# Patient Record
Sex: MALE | Race: WHITE | NOT HISPANIC OR LATINO | Employment: FULL TIME | URBAN - METROPOLITAN AREA
[De-identification: names, ages, dates, MRNs, and addresses within clinical notes are randomized per-mention and may not be internally consistent; named-entity substitution may affect disease eponyms.]

---

## 2023-02-18 PROBLEM — M51.369 DDD (DEGENERATIVE DISC DISEASE), LUMBAR: Status: ACTIVE | Noted: 2023-02-18

## 2023-02-18 PROBLEM — M51.36 DDD (DEGENERATIVE DISC DISEASE), LUMBAR: Status: ACTIVE | Noted: 2023-02-18

## 2023-02-18 PROBLEM — R93.2 ABNORMAL ULTRASOUND OF LIVER: Status: ACTIVE | Noted: 2023-02-18

## 2023-02-18 PROBLEM — E78.1 ESSENTIAL HYPERTRIGLYCERIDEMIA: Status: ACTIVE | Noted: 2023-02-18

## 2023-02-18 PROBLEM — I10 BENIGN ESSENTIAL HYPERTENSION: Status: ACTIVE | Noted: 2023-02-18

## 2023-02-18 PROBLEM — E55.9 VITAMIN D DEFICIENCY: Status: ACTIVE | Noted: 2023-02-18

## 2023-02-18 PROBLEM — M25.512 CHRONIC LEFT SHOULDER PAIN: Status: ACTIVE | Noted: 2023-02-18

## 2023-02-18 PROBLEM — M79.672 LEFT FOOT PAIN: Status: ACTIVE | Noted: 2023-02-18

## 2023-02-18 PROBLEM — K76.0 NAFLD (NONALCOHOLIC FATTY LIVER DISEASE): Status: ACTIVE | Noted: 2023-02-18

## 2023-02-18 PROBLEM — M75.42 IMPINGEMENT SYNDROME OF LEFT SHOULDER: Status: ACTIVE | Noted: 2023-02-18

## 2023-02-18 PROBLEM — E66.812 CLASS 2 SEVERE OBESITY DUE TO EXCESS CALORIES WITH SERIOUS COMORBIDITY AND BODY MASS INDEX (BMI) OF 39.0 TO 39.9 IN ADULT: Status: ACTIVE | Noted: 2023-02-18

## 2023-02-18 PROBLEM — Z86.16 HISTORY OF COVID-19: Status: ACTIVE | Noted: 2023-02-18

## 2023-02-18 PROBLEM — G47.33 OBSTRUCTIVE SLEEP APNEA: Status: ACTIVE | Noted: 2023-02-18

## 2023-02-18 PROBLEM — R79.89 ABNORMAL LFTS: Status: ACTIVE | Noted: 2023-02-18

## 2023-02-18 PROBLEM — R82.90 ABNORMAL URINE ODOR: Status: ACTIVE | Noted: 2023-02-18

## 2023-02-18 PROBLEM — M67.819 TENDINOSIS OF ROTATOR CUFF: Status: ACTIVE | Noted: 2023-02-18

## 2023-02-18 PROBLEM — E78.5 HYPERLIPIDEMIA: Status: ACTIVE | Noted: 2023-02-18

## 2023-02-18 PROBLEM — K76.9 LIVER LESION: Status: ACTIVE | Noted: 2023-02-18

## 2023-02-18 PROBLEM — R10.11 RIGHT UPPER QUADRANT ABDOMINAL PAIN: Status: ACTIVE | Noted: 2023-02-18

## 2023-02-18 PROBLEM — R07.89 ATYPICAL CHEST PAIN: Status: ACTIVE | Noted: 2023-02-18

## 2023-02-18 PROBLEM — M54.50 LUMBAR SPINE PAIN: Status: ACTIVE | Noted: 2023-02-18

## 2023-02-18 PROBLEM — G89.29 CHRONIC LEFT SHOULDER PAIN: Status: ACTIVE | Noted: 2023-02-18

## 2023-02-18 PROBLEM — G93.0 INTRACRANIAL ARACHNOID CYST: Status: ACTIVE | Noted: 2023-02-18

## 2023-02-18 PROBLEM — L98.9 SKIN LESIONS: Status: ACTIVE | Noted: 2023-02-18

## 2023-02-18 PROBLEM — Z04.9 CONDITION NOT FOUND: Status: ACTIVE | Noted: 2023-02-18

## 2023-02-18 PROBLEM — M19.072 ARTHRITIS OF FOOT, LEFT: Status: ACTIVE | Noted: 2023-02-18

## 2023-02-18 PROBLEM — E83.10 DISORDER OF IRON METABOLISM: Status: ACTIVE | Noted: 2023-02-18

## 2023-02-18 PROBLEM — E66.01 MORBID OBESITY (MULTI): Status: ACTIVE | Noted: 2023-02-18

## 2023-02-18 PROBLEM — K64.9 HEMORRHOID: Status: ACTIVE | Noted: 2023-02-18

## 2023-02-18 PROBLEM — K62.5 RECTAL BLEEDING: Status: ACTIVE | Noted: 2023-02-18

## 2023-02-18 RX ORDER — LISINOPRIL 20 MG/1
1 TABLET ORAL DAILY
COMMUNITY
Start: 2022-05-10 | End: 2024-03-08 | Stop reason: SDUPTHER

## 2023-02-18 RX ORDER — FENOFIBRATE 160 MG/1
1 TABLET ORAL DAILY
COMMUNITY
Start: 2021-04-07 | End: 2024-03-08 | Stop reason: SDUPTHER

## 2023-02-18 RX ORDER — ACETAMINOPHEN 500 MG
1 TABLET ORAL DAILY
COMMUNITY
Start: 2021-04-07

## 2023-03-08 ENCOUNTER — APPOINTMENT (OUTPATIENT)
Dept: PRIMARY CARE | Facility: CLINIC | Age: 43
End: 2023-03-08
Payer: COMMERCIAL

## 2023-10-12 ENCOUNTER — HOSPITAL ENCOUNTER (EMERGENCY)
Facility: HOSPITAL | Age: 43
Discharge: HOME | End: 2023-10-13
Attending: EMERGENCY MEDICINE
Payer: OTHER GOVERNMENT

## 2023-10-12 VITALS
WEIGHT: 240 LBS | SYSTOLIC BLOOD PRESSURE: 161 MMHG | DIASTOLIC BLOOD PRESSURE: 94 MMHG | HEART RATE: 76 BPM | BODY MASS INDEX: 38.57 KG/M2 | HEIGHT: 66 IN | RESPIRATION RATE: 18 BRPM | TEMPERATURE: 97.9 F

## 2023-10-12 DIAGNOSIS — R10.9 FLANK PAIN: Primary | ICD-10-CM

## 2023-10-12 LAB
APPEARANCE UR: CLEAR
BILIRUB UR STRIP.AUTO-MCNC: NEGATIVE MG/DL
COLOR UR: YELLOW
GLUCOSE UR STRIP.AUTO-MCNC: NEGATIVE MG/DL
HOLD SPECIMEN: NORMAL
KETONES UR STRIP.AUTO-MCNC: NEGATIVE MG/DL
LEUKOCYTE ESTERASE UR QL STRIP.AUTO: NEGATIVE
MUCOUS THREADS #/AREA URNS AUTO: ABNORMAL /LPF
NITRITE UR QL STRIP.AUTO: NEGATIVE
PH UR STRIP.AUTO: 5 [PH]
PROT UR STRIP.AUTO-MCNC: NEGATIVE MG/DL
RBC # UR STRIP.AUTO: ABNORMAL /UL
RBC #/AREA URNS AUTO: ABNORMAL /HPF
SP GR UR STRIP.AUTO: 1.02
UROBILINOGEN UR STRIP.AUTO-MCNC: 2 MG/DL
WBC #/AREA URNS AUTO: ABNORMAL /HPF

## 2023-10-12 PROCEDURE — 81001 URINALYSIS AUTO W/SCOPE: CPT | Performed by: EMERGENCY MEDICINE

## 2023-10-12 PROCEDURE — 99284 EMERGENCY DEPT VISIT MOD MDM: CPT | Mod: 25 | Performed by: EMERGENCY MEDICINE

## 2023-10-12 RX ORDER — OMEPRAZOLE 20 MG/1
20 CAPSULE, DELAYED RELEASE ORAL
COMMUNITY
Start: 2023-03-17

## 2023-10-12 ASSESSMENT — LIFESTYLE VARIABLES
EVER FELT BAD OR GUILTY ABOUT YOUR DRINKING: NO
HAVE PEOPLE ANNOYED YOU BY CRITICIZING YOUR DRINKING: NO
EVER HAD A DRINK FIRST THING IN THE MORNING TO STEADY YOUR NERVES TO GET RID OF A HANGOVER: NO
REASON UNABLE TO ASSESS: NO
HAVE YOU EVER FELT YOU SHOULD CUT DOWN ON YOUR DRINKING: NO

## 2023-10-12 ASSESSMENT — PAIN SCALES - GENERAL: PAINLEVEL_OUTOF10: 6

## 2023-10-12 ASSESSMENT — PAIN - FUNCTIONAL ASSESSMENT: PAIN_FUNCTIONAL_ASSESSMENT: 0-10

## 2023-10-12 ASSESSMENT — COLUMBIA-SUICIDE SEVERITY RATING SCALE - C-SSRS
1. IN THE PAST MONTH, HAVE YOU WISHED YOU WERE DEAD OR WISHED YOU COULD GO TO SLEEP AND NOT WAKE UP?: NO
6. HAVE YOU EVER DONE ANYTHING, STARTED TO DO ANYTHING, OR PREPARED TO DO ANYTHING TO END YOUR LIFE?: NO
2. HAVE YOU ACTUALLY HAD ANY THOUGHTS OF KILLING YOURSELF?: NO

## 2023-10-12 ASSESSMENT — PAIN DESCRIPTION - DESCRIPTORS: DESCRIPTORS: SHARP

## 2023-10-12 ASSESSMENT — PAIN DESCRIPTION - ORIENTATION: ORIENTATION: RIGHT

## 2023-10-13 ENCOUNTER — APPOINTMENT (OUTPATIENT)
Dept: RADIOLOGY | Facility: HOSPITAL | Age: 43
End: 2023-10-13
Payer: OTHER GOVERNMENT

## 2023-10-13 LAB
ALBUMIN SERPL BCP-MCNC: 4.5 G/DL (ref 3.4–5)
ALP SERPL-CCNC: 69 U/L (ref 33–120)
ALT SERPL W P-5'-P-CCNC: 91 U/L (ref 10–52)
ANION GAP SERPL CALC-SCNC: 12 MMOL/L (ref 10–20)
AST SERPL W P-5'-P-CCNC: 65 U/L (ref 9–39)
BASOPHILS # BLD AUTO: 0.07 X10*3/UL (ref 0–0.1)
BASOPHILS NFR BLD AUTO: 1 %
BILIRUB DIRECT SERPL-MCNC: 0 MG/DL (ref 0–0.3)
BILIRUB SERPL-MCNC: 0.8 MG/DL (ref 0–1.2)
BUN SERPL-MCNC: 11 MG/DL (ref 6–23)
CALCIUM SERPL-MCNC: 9.3 MG/DL (ref 8.6–10.3)
CHLORIDE SERPL-SCNC: 101 MMOL/L (ref 98–107)
CO2 SERPL-SCNC: 25 MMOL/L (ref 21–32)
CREAT SERPL-MCNC: 0.82 MG/DL (ref 0.5–1.3)
EOSINOPHIL # BLD AUTO: 0.2 X10*3/UL (ref 0–0.7)
EOSINOPHIL NFR BLD AUTO: 2.7 %
ERYTHROCYTE [DISTWIDTH] IN BLOOD BY AUTOMATED COUNT: 12.7 % (ref 11.5–14.5)
GFR SERPL CREATININE-BSD FRML MDRD: >90 ML/MIN/1.73M*2
GLUCOSE SERPL-MCNC: 107 MG/DL (ref 74–99)
HCT VFR BLD AUTO: 44.1 % (ref 41–52)
HGB BLD-MCNC: 15.1 G/DL (ref 13.5–17.5)
HOLD SPECIMEN: NORMAL
IMM GRANULOCYTES # BLD AUTO: 0.02 X10*3/UL (ref 0–0.7)
IMM GRANULOCYTES NFR BLD AUTO: 0.3 % (ref 0–0.9)
LIPASE SERPL-CCNC: 28 U/L (ref 9–82)
LYMPHOCYTES # BLD AUTO: 2.84 X10*3/UL (ref 1.2–4.8)
LYMPHOCYTES NFR BLD AUTO: 39 %
MCH RBC QN AUTO: 29.7 PG (ref 26–34)
MCHC RBC AUTO-ENTMCNC: 34.2 G/DL (ref 32–36)
MCV RBC AUTO: 87 FL (ref 80–100)
MONOCYTES # BLD AUTO: 0.74 X10*3/UL (ref 0.1–1)
MONOCYTES NFR BLD AUTO: 10.2 %
NEUTROPHILS # BLD AUTO: 3.42 X10*3/UL (ref 1.2–7.7)
NEUTROPHILS NFR BLD AUTO: 46.8 %
NRBC BLD-RTO: 0 /100 WBCS (ref 0–0)
PLATELET # BLD AUTO: 170 X10*3/UL (ref 150–450)
PMV BLD AUTO: 11 FL (ref 7.5–11.5)
POTASSIUM SERPL-SCNC: 3.4 MMOL/L (ref 3.5–5.3)
PROT SERPL-MCNC: 7 G/DL (ref 6.4–8.2)
RBC # BLD AUTO: 5.08 X10*6/UL (ref 4.5–5.9)
SODIUM SERPL-SCNC: 135 MMOL/L (ref 136–145)
WBC # BLD AUTO: 7.3 X10*3/UL (ref 4.4–11.3)

## 2023-10-13 PROCEDURE — 85025 COMPLETE CBC W/AUTO DIFF WBC: CPT | Performed by: EMERGENCY MEDICINE

## 2023-10-13 PROCEDURE — 83690 ASSAY OF LIPASE: CPT | Performed by: EMERGENCY MEDICINE

## 2023-10-13 PROCEDURE — 36415 COLL VENOUS BLD VENIPUNCTURE: CPT | Performed by: EMERGENCY MEDICINE

## 2023-10-13 PROCEDURE — 80053 COMPREHEN METABOLIC PANEL: CPT | Performed by: EMERGENCY MEDICINE

## 2023-10-13 PROCEDURE — 82248 BILIRUBIN DIRECT: CPT | Performed by: EMERGENCY MEDICINE

## 2023-10-13 PROCEDURE — 2500000004 HC RX 250 GENERAL PHARMACY W/ HCPCS (ALT 636 FOR OP/ED): Performed by: EMERGENCY MEDICINE

## 2023-10-13 PROCEDURE — 74176 CT ABD & PELVIS W/O CONTRAST: CPT | Mod: FOREIGN READ | Performed by: RADIOLOGY

## 2023-10-13 PROCEDURE — 74176 CT ABD & PELVIS W/O CONTRAST: CPT | Mod: FR

## 2023-10-13 RX ORDER — KETOROLAC TROMETHAMINE 30 MG/ML
15 INJECTION, SOLUTION INTRAMUSCULAR; INTRAVENOUS ONCE
Status: COMPLETED | OUTPATIENT
Start: 2023-10-13 | End: 2023-10-13

## 2023-10-13 RX ORDER — ACETAMINOPHEN 325 MG/1
650 TABLET ORAL ONCE
Status: COMPLETED | OUTPATIENT
Start: 2023-10-13 | End: 2023-10-13

## 2023-10-13 RX ORDER — NAPROXEN 500 MG/1
500 TABLET ORAL
Qty: 30 TABLET | Refills: 0 | Status: SHIPPED | OUTPATIENT
Start: 2023-10-13 | End: 2023-10-28

## 2023-10-13 RX ORDER — METHOCARBAMOL 500 MG/1
500 TABLET, FILM COATED ORAL 2 TIMES DAILY
Qty: 20 TABLET | Refills: 0 | Status: SHIPPED | OUTPATIENT
Start: 2023-10-13 | End: 2023-10-23

## 2023-10-13 RX ORDER — LIDOCAINE 50 MG/G
1 PATCH TOPICAL DAILY
Qty: 10 PATCH | Refills: 0 | Status: SHIPPED | OUTPATIENT
Start: 2023-10-13 | End: 2023-10-23

## 2023-10-13 RX ORDER — ONDANSETRON HYDROCHLORIDE 2 MG/ML
4 INJECTION, SOLUTION INTRAVENOUS ONCE
Status: COMPLETED | OUTPATIENT
Start: 2023-10-13 | End: 2023-10-13

## 2023-10-13 RX ADMIN — KETOROLAC TROMETHAMINE 15 MG: 30 INJECTION, SOLUTION INTRAMUSCULAR at 00:44

## 2023-10-13 RX ADMIN — ACETAMINOPHEN 650 MG: 325 TABLET ORAL at 00:50

## 2023-10-13 RX ADMIN — ONDANSETRON 4 MG: 2 INJECTION INTRAMUSCULAR; INTRAVENOUS at 00:44

## 2023-10-13 RX ADMIN — SODIUM CHLORIDE 1000 ML: 9 INJECTION, SOLUTION INTRAVENOUS at 00:51

## 2023-10-13 ASSESSMENT — PAIN SCALES - GENERAL: PAINLEVEL_OUTOF10: 7

## 2023-10-13 NOTE — ED TRIAGE NOTES
Pt to ED with c/o R flank pain since yesterday. Denies urinary symptoms or any injuries. No meds pta for pain.

## 2023-10-13 NOTE — ED PROVIDER NOTES
HPI   Chief Complaint   Patient presents with   • Flank Pain     R       HPI                    No data recorded                Patient History   Past Medical History:   Diagnosis Date   • GERD (gastroesophageal reflux disease)    • High triglycerides    • Hypertension    • Personal history of COVID-19     History of COVID-19     Past Surgical History:   Procedure Laterality Date   • OTHER SURGICAL HISTORY  05/19/2022    [place] tooth extraction   • OTHER SURGICAL HISTORY  05/19/2022    Tongue surgery   • OTHER SURGICAL HISTORY  05/19/2022    Vasectomy   • OTHER SURGICAL HISTORY  03/23/2021    Nasal septoplasty     Family History   Problem Relation Name Age of Onset   • Other (CARDIAC DISORDER) Mother     • Diabetes Mother     • Kidney disease Mother     • Liver disease Mother     • Other (CVA) Brother     • Deep vein thrombosis Brother     • Lung cancer Brother     • Brain cancer Brother       Social History     Tobacco Use   • Smoking status: Some Days     Types: Cigarettes   • Smokeless tobacco: Never   Vaping Use   • Vaping Use: Never used   Substance Use Topics   • Alcohol use: Not Currently   • Drug use: Never       Physical Exam   ED Triage Vitals [10/12/23 2051]   Temp Heart Rate Resp BP   36.6 °C (97.9 °F) 76 18 (!) 161/94      SpO2 Temp Source Heart Rate Source Patient Position   -- Temporal -- --      BP Location FiO2 (%)     -- --       Physical Exam    ED Course & MDM   ED Course as of 10/17/23 1551   Fri Oct 13, 2023   0403 Patient's labs with small amount of RBCs in the urine, no evidence of infection.  Patient's BMP largely reassuring, cell count unremarkable, lipase unremarkable, CT scan showing no acute intra-abdominal pathology.  Patient's pain improved following medication administration.  Given location of pain and negative imaging/unremarkable labs, feel this is most likely musculoskeletal in nature, will treat with lidocaine patches, naproxen.  Patient's LFTs are minimally elevated but this  appears nearly baseline for the patient and patient does have steatohepatitis on CT scan.  Discussed return precautions, medication use and need for close follow-up with the patient, he conveys understanding to these instructions.  Patient discharged in good condition. [BR]      ED Course User Index  [BR] Tej Mojica MD         Diagnoses as of 10/17/23 1551   Flank pain       Medical Decision Making      Procedure  Procedures     Tej Mojica MD  10/17/23 1551

## 2024-03-08 ENCOUNTER — TELEPHONE (OUTPATIENT)
Dept: PRIMARY CARE | Facility: CLINIC | Age: 44
End: 2024-03-08
Payer: OTHER GOVERNMENT

## 2024-03-08 DIAGNOSIS — E78.1 ESSENTIAL HYPERTRIGLYCERIDEMIA: ICD-10-CM

## 2024-03-08 DIAGNOSIS — I10 BENIGN ESSENTIAL HYPERTENSION: ICD-10-CM

## 2024-03-08 NOTE — TELEPHONE ENCOUNTER
Rx Refill Request Telephone Encounter    Name:  Ralph Shelton  :  [id number]  Medication Name:      lisinopril 20 mg tablet   Route: Take 1 tablet (20 mg) by mouth once daily.    fenofibrate (Triglide) 160 mg tablet   Route: Take 1 tablet (160 mg) by mouth once daily.    Specific Pharmacy location:  [address]   Date of last appointment:  22  Date of next appointment:  4/3/24  Best number to reach patient: [phone]

## 2024-03-10 DIAGNOSIS — I10 BENIGN ESSENTIAL HYPERTENSION: ICD-10-CM

## 2024-03-10 DIAGNOSIS — E78.2 MIXED HYPERLIPIDEMIA: ICD-10-CM

## 2024-03-10 DIAGNOSIS — E55.9 VITAMIN D DEFICIENCY: Primary | ICD-10-CM

## 2024-03-10 DIAGNOSIS — K76.0 NAFLD (NONALCOHOLIC FATTY LIVER DISEASE): ICD-10-CM

## 2024-03-10 DIAGNOSIS — Z13.29 THYROID DISORDER SCREEN: ICD-10-CM

## 2024-03-10 PROBLEM — R07.89 ATYPICAL CHEST PAIN: Status: RESOLVED | Noted: 2023-02-18 | Resolved: 2024-03-10

## 2024-03-10 RX ORDER — FENOFIBRATE 160 MG/1
160 TABLET ORAL DAILY
Qty: 90 TABLET | Refills: 3 | Status: SHIPPED | OUTPATIENT
Start: 2024-03-10

## 2024-03-10 RX ORDER — LISINOPRIL 20 MG/1
20 TABLET ORAL DAILY
Qty: 90 TABLET | Refills: 3 | Status: SHIPPED | OUTPATIENT
Start: 2024-03-10

## 2024-03-11 ENCOUNTER — APPOINTMENT (OUTPATIENT)
Dept: CARDIOLOGY | Facility: HOSPITAL | Age: 44
End: 2024-03-11
Payer: OTHER GOVERNMENT

## 2024-03-11 ENCOUNTER — HOSPITAL ENCOUNTER (EMERGENCY)
Facility: HOSPITAL | Age: 44
Discharge: HOME | End: 2024-03-11
Attending: EMERGENCY MEDICINE
Payer: OTHER GOVERNMENT

## 2024-03-11 ENCOUNTER — APPOINTMENT (OUTPATIENT)
Dept: RADIOLOGY | Facility: HOSPITAL | Age: 44
End: 2024-03-11
Payer: OTHER GOVERNMENT

## 2024-03-11 VITALS
TEMPERATURE: 98.5 F | HEART RATE: 69 BPM | SYSTOLIC BLOOD PRESSURE: 141 MMHG | HEIGHT: 66 IN | BODY MASS INDEX: 39.37 KG/M2 | DIASTOLIC BLOOD PRESSURE: 80 MMHG | OXYGEN SATURATION: 97 % | RESPIRATION RATE: 18 BRPM | WEIGHT: 245 LBS

## 2024-03-11 DIAGNOSIS — J06.9 UPPER RESPIRATORY TRACT INFECTION, UNSPECIFIED TYPE: Primary | ICD-10-CM

## 2024-03-11 LAB
ALBUMIN SERPL BCP-MCNC: 4.4 G/DL (ref 3.4–5)
ALP SERPL-CCNC: 83 U/L (ref 33–120)
ALT SERPL W P-5'-P-CCNC: 90 U/L (ref 10–52)
ANION GAP SERPL CALC-SCNC: 11 MMOL/L (ref 10–20)
AST SERPL W P-5'-P-CCNC: 55 U/L (ref 9–39)
ATRIAL RATE: 73 BPM
BASOPHILS # BLD AUTO: 0.06 X10*3/UL (ref 0–0.1)
BASOPHILS NFR BLD AUTO: 0.9 %
BILIRUB DIRECT SERPL-MCNC: 0.1 MG/DL (ref 0–0.3)
BILIRUB SERPL-MCNC: 0.4 MG/DL (ref 0–1.2)
BUN SERPL-MCNC: 8 MG/DL (ref 6–23)
CALCIUM SERPL-MCNC: 9.2 MG/DL (ref 8.6–10.3)
CARDIAC TROPONIN I PNL SERPL HS: 11 NG/L (ref 0–20)
CHLORIDE SERPL-SCNC: 103 MMOL/L (ref 98–107)
CO2 SERPL-SCNC: 24 MMOL/L (ref 21–32)
CREAT SERPL-MCNC: 0.76 MG/DL (ref 0.5–1.3)
EGFRCR SERPLBLD CKD-EPI 2021: >90 ML/MIN/1.73M*2
EOSINOPHIL # BLD AUTO: 0.11 X10*3/UL (ref 0–0.7)
EOSINOPHIL NFR BLD AUTO: 1.7 %
ERYTHROCYTE [DISTWIDTH] IN BLOOD BY AUTOMATED COUNT: 12.6 % (ref 11.5–14.5)
GLUCOSE SERPL-MCNC: 129 MG/DL (ref 74–99)
HCT VFR BLD AUTO: 42.2 % (ref 41–52)
HGB BLD-MCNC: 14.3 G/DL (ref 13.5–17.5)
IMM GRANULOCYTES # BLD AUTO: 0.04 X10*3/UL (ref 0–0.7)
IMM GRANULOCYTES NFR BLD AUTO: 0.6 % (ref 0–0.9)
LYMPHOCYTES # BLD AUTO: 2.09 X10*3/UL (ref 1.2–4.8)
LYMPHOCYTES NFR BLD AUTO: 31.4 %
MCH RBC QN AUTO: 29 PG (ref 26–34)
MCHC RBC AUTO-ENTMCNC: 33.9 G/DL (ref 32–36)
MCV RBC AUTO: 86 FL (ref 80–100)
MONOCYTES # BLD AUTO: 0.56 X10*3/UL (ref 0.1–1)
MONOCYTES NFR BLD AUTO: 8.4 %
NEUTROPHILS # BLD AUTO: 3.79 X10*3/UL (ref 1.2–7.7)
NEUTROPHILS NFR BLD AUTO: 57 %
NRBC BLD-RTO: 0 /100 WBCS (ref 0–0)
P AXIS: 8 DEGREES
P OFFSET: 197 MS
P ONSET: 158 MS
PLATELET # BLD AUTO: 195 X10*3/UL (ref 150–450)
POTASSIUM SERPL-SCNC: 3.9 MMOL/L (ref 3.5–5.3)
PR INTERVAL: 118 MS
PROT SERPL-MCNC: 6.9 G/DL (ref 6.4–8.2)
Q ONSET: 217 MS
QRS COUNT: 12 BEATS
QRS DURATION: 84 MS
QT INTERVAL: 342 MS
QTC CALCULATION(BAZETT): 376 MS
QTC FREDERICIA: 365 MS
R AXIS: 49 DEGREES
RBC # BLD AUTO: 4.93 X10*6/UL (ref 4.5–5.9)
SODIUM SERPL-SCNC: 134 MMOL/L (ref 136–145)
T AXIS: 94 DEGREES
T OFFSET: 388 MS
VENTRICULAR RATE: 73 BPM
WBC # BLD AUTO: 6.7 X10*3/UL (ref 4.4–11.3)

## 2024-03-11 PROCEDURE — 36415 COLL VENOUS BLD VENIPUNCTURE: CPT | Performed by: EMERGENCY MEDICINE

## 2024-03-11 PROCEDURE — 99284 EMERGENCY DEPT VISIT MOD MDM: CPT | Mod: 25

## 2024-03-11 PROCEDURE — 85025 COMPLETE CBC W/AUTO DIFF WBC: CPT | Performed by: EMERGENCY MEDICINE

## 2024-03-11 PROCEDURE — 84484 ASSAY OF TROPONIN QUANT: CPT | Performed by: EMERGENCY MEDICINE

## 2024-03-11 PROCEDURE — 71046 X-RAY EXAM CHEST 2 VIEWS: CPT

## 2024-03-11 PROCEDURE — 82248 BILIRUBIN DIRECT: CPT | Performed by: EMERGENCY MEDICINE

## 2024-03-11 PROCEDURE — 93005 ELECTROCARDIOGRAM TRACING: CPT

## 2024-03-11 PROCEDURE — 99283 EMERGENCY DEPT VISIT LOW MDM: CPT | Mod: 25

## 2024-03-11 PROCEDURE — 71046 X-RAY EXAM CHEST 2 VIEWS: CPT | Performed by: RADIOLOGY

## 2024-03-11 ASSESSMENT — COLUMBIA-SUICIDE SEVERITY RATING SCALE - C-SSRS
6. HAVE YOU EVER DONE ANYTHING, STARTED TO DO ANYTHING, OR PREPARED TO DO ANYTHING TO END YOUR LIFE?: NO
1. IN THE PAST MONTH, HAVE YOU WISHED YOU WERE DEAD OR WISHED YOU COULD GO TO SLEEP AND NOT WAKE UP?: NO
2. HAVE YOU ACTUALLY HAD ANY THOUGHTS OF KILLING YOURSELF?: NO

## 2024-03-11 ASSESSMENT — HEART SCORE
RISK FACTORS: 1-2 RISK FACTORS
AGE: <45
TROPONIN: LESS THAN OR EQUAL TO NORMAL LIMIT
HISTORY: SLIGHTLY SUSPICIOUS
ECG: NON-SPECIFIC REPOLARIZATION DISTURBANCE
HEART SCORE: 2

## 2024-03-11 NOTE — ED PROVIDER NOTES
HPI   No chief complaint on file.      HPI: []  43-year-old white male with history of hypertension, dyslipidemia, GERD today comes in with abnormal EKG.  Patient states over the last couple days he had URI symptoms cough and congestion.  Over the weekend he had annual physical examination [facility] [employer].  EKG was done which was abnormal and he was told to follow-up.  He went to urgent care this morning.  EKG was done which was abnormal and he was sent to the ED for evaluation.  He was prescribed antibiotics in the urgent care.  He has no complaints today.  He has no chest pain or shortness breath.  No dizziness no syncope or near syncope.  History of CAD in the past.  The only complaint he has a cough nonproductive.  No PND no orthopnea no syncope or near syncope.  No recent travel hospitalization or antibiotic use.    Past history: Hypertension, dyslipidemia, fatty liver, tobacco use quit about a month ago  Social: Patient is a smoker quit about a month ago denies a current tobacco alcohol drug abuse.    Past history: Hypertension  REVIEW OF SYSTEMS:    GENERAL.: No weight loss, fatigue, anorexia, insomnia, fever.    EYES: No vision loss, double vision, drainage, eye pain.    ENT: No pharyngitis, dry mouth.    CARDIOPULMONARY: No chest pain, palpitations, syncope, near syncope. No shortness of breath, positive for cough, hemoptysis.    GI: No abdominal pain, change in bowel habits, melena, hematemesis, hematochezia, nausea, vomiting, diarrhea.    : No discharge, dysuria, frequency, urgency, hematuria.    MS: No limb pain, joint pain, joint swelling.    SKIN: No rashes.    PSYCH: No depression, anxiety, suicidality, homicidality.    Review of systems is otherwise negative unless stated above or in history of present illness.  Social history, family history, allergies reviewed.  PHYSICAL EXAM:    GENERAL: Vitals noted, no distress. Alert and oriented  x 3. Non-toxic.  High BMI    EENT: TMs clear. Posterior  oropharynx unremarkable. No meningismus. No LAD.     NECK: Supple. Nontender. No midline tenderness.     CARDIAC: Regular, rate, rhythm. No murmurs rubs or gallops. No JVD    PULMONARY: Lungs clear bilaterally with good aeration. No wheezes rales or rhonchi. No respiratory distress.     ABDOMEN: Soft, nonsurgical. Nontender. No peritoneal signs. Normoactive bowel sounds. No pulsatile masses.  Negative CVA tenderness    EXTREMITIES: No peripheral edema. Negative Homans bilaterally, no cords.  2+ bounding pulses well-perfused    SKIN: No rash. Intact.     NEURO: No focal neurologic deficits, NIH score of 0. Cranial nerves normal as tested from II through XII.     MEDICAL DECISION MAKING:  EKG on my interpretation shows normal sinus rhythm normal axis rate in the mid 60s with no acute ischemic changes.  Had some nonspecific T wave inversions no ST elevations    CBC shows a leukocytosis stable hemoglobin chemistries unremarkable LFTs show transaminitis troponin negative chest x-ray negative.    Treatment in ED: IV established, placed on a cardiac monitor.    ED course: Patient remains asymptomatic no chest pain shortness of breath blood pressure upon arrival was high repeat blood pressure 140/80 he remains asymptomatic.    Impression: #1 abnormal EKG, #2 transaminitis, #3 a viral URI    Plan set MDM: 43-year-old white male history of hypertension dyslipidemia sent to the ER because of abnormal EKG EKG is not impressive from ischemic standpoint currently patient is symptom-free she has no symptoms or signs of acute coronary syndrome dissection or pulm embolism.  His troponin is negative which is reassuring.  Patient treated as a viral URI patient  Made aware of the abnormal lab findings including transaminitis, advised and outpatient follow-up with primary doctor with strict return precaution.  Low concern for ACS STEMI NSTEMI dissection or pulm embolism.                          No data recorded                    Patient History   Past Medical History:   Diagnosis Date    GERD (gastroesophageal reflux disease)     High triglycerides     Hypertension     Personal history of COVID-19     History of COVID-19     Past Surgical History:   Procedure Laterality Date    OTHER SURGICAL HISTORY  05/19/2022    [place] tooth extraction    OTHER SURGICAL HISTORY  05/19/2022    Tongue surgery    OTHER SURGICAL HISTORY  05/19/2022    Vasectomy    OTHER SURGICAL HISTORY  03/23/2021    Nasal septoplasty     Family History   Problem Relation Name Age of Onset    Other (CARDIAC DISORDER) Mother      Diabetes Mother      Kidney disease Mother      Liver disease Mother      Other (CVA) Brother      Deep vein thrombosis Brother      Lung cancer Brother      Brain cancer Brother       Social History     Tobacco Use    Smoking status: Some Days     Types: Cigarettes    Smokeless tobacco: Never   Vaping Use    Vaping Use: Never used   Substance Use Topics    Alcohol use: Not Currently    Drug use: Never       Physical Exam   ED Triage Vitals   Temperature Heart Rate Respirations BP   03/11/24 1319 03/11/24 1319 03/11/24 1319 03/11/24 1319   36.9 °C (98.5 °F) 84 15 (!) 210/99      Pulse Ox Temp src Heart Rate Source Patient Position   03/11/24 1319 -- 03/11/24 1503 03/11/24 1503   98 %  Monitor Sitting      BP Location FiO2 (%)     03/11/24 1503 --     Right arm        Physical Exam    ED Course & MDM   ED Course as of 03/11/24 1609   Mon Mar 11, 2024   1510 Patient EKG on my interpretation shows normal sinus rhythm normal axis rate in the mid 70s with nonspecific ST-T wave change.  CBC shows no leukocytosis chemistries unremarkable LFTs show transaminitis troponin negative.  Chest x-ray negative.  Patient blood pressure came down to 140/80 remains asymptomatic has a viral URI low concern for STEMI NSTEMI ACS dissection or pulm embolism.  Patient advised outpatient cardiac stress test with strict return precaution. [MT]      ED Course User  Index  [MT] Juan Pablo Vega MD         Diagnoses as of 03/11/24 1609   Upper respiratory tract infection, unspecified type       Medical Decision Making      Procedure  Procedures     Juan Pablo Vega MD  03/11/24 9017

## 2024-03-11 NOTE — ED TRIAGE NOTES
PT TO ED FROM HOME WITH C/O EKG CHANGES NOTED OVER THE WEEKEND AT A  PHYSICAL. PT RECENTLY RECOVERED FROM [facility]. PT HAS HTN HX AND STATES COMPLIANCY WITH PRESCRIBED MEDS.

## 2024-05-21 ENCOUNTER — HOSPITAL ENCOUNTER (OUTPATIENT)
Dept: RADIOLOGY | Facility: EXTERNAL LOCATION | Age: 44
Discharge: HOME | End: 2024-05-21
Payer: OTHER GOVERNMENT

## 2024-05-21 DIAGNOSIS — M79.671 RIGHT FOOT PAIN: ICD-10-CM

## 2024-05-30 ENCOUNTER — OFFICE VISIT (OUTPATIENT)
Dept: ORTHOPEDIC SURGERY | Facility: CLINIC | Age: 44
End: 2024-05-30
Payer: OTHER GOVERNMENT

## 2024-05-30 DIAGNOSIS — M76.821 POSTERIOR TIBIAL TENDINITIS OF RIGHT LOWER EXTREMITY: ICD-10-CM

## 2024-05-30 DIAGNOSIS — M65.9 SYNOVITIS OF LEFT FOOT: Primary | ICD-10-CM

## 2024-05-30 DIAGNOSIS — M65.9 SYNOVITIS OF RIGHT ANKLE: ICD-10-CM

## 2024-05-30 DIAGNOSIS — M79.671 RIGHT FOOT PAIN: ICD-10-CM

## 2024-05-30 PROCEDURE — 3008F BODY MASS INDEX DOCD: CPT | Performed by: ORTHOPAEDIC SURGERY

## 2024-05-30 PROCEDURE — 99214 OFFICE O/P EST MOD 30 MIN: CPT | Performed by: ORTHOPAEDIC SURGERY

## 2024-05-30 RX ORDER — METHYLPREDNISOLONE 4 MG/1
TABLET ORAL
Qty: 21 TABLET | Refills: 0 | Status: SHIPPED | OUTPATIENT
Start: 2024-05-30 | End: 2024-06-06

## 2024-05-30 NOTE — PROGRESS NOTES
Subjective    Patient ID: Ralph Shelton is a 44 y.o. male.    Chief Complaint: OTHER (RT FOOT PAIN FOR 4 WEEKS./NKI)       44-year-old male who presents to this office for the first time for evaluation of right foot and ankle pain.  He describes a majority the pain along the medial aspect of the foot just behind the medial malleolus.  He occasionally has sharp stabbing pain.  Pain is made worse with going up and down steps.  Also notes pain with walking.  Remains ambulatory without using assistive device.  Has been using various anti-inflammatories over-the-counter including ibuprofen and naproxen without improvement.  Patient's job in the  requires him to be on his feet for prolonged periods of time.  He has had previous issues with regard to the left foot in 2022 when he saw a foot and ankle specialist Dr. Khan and showed improvement with conservative treatment.  He never had the issue though with the right foot and ankle in the past.    This patient's past medical, social, and family history were reviewed as well as a review of systems including updates on the patient's information encounter sheet    Physical Examination  Constitutional: Patient's height and weight reviewed, appears well kempt  Psychiatric: Alert and oriented ×3, appropriate mood and behavior  Pulmonary: Breathing appears nonlabored, no apparent distress  Lymphatic: No appreciable lymphadenopathy to both the upper and lower extremities  Skin: No open lesions, rashes, ulcerations  Neurologic: Gross motor and sensory exam appear intact (except for abnormalities noted in the below muscle skeletal exam)    Musculoskeletal: The right ankle mortise is well reduced and stable to ligamentous examination.  There is no evidence of an ankle effusion.  There is a prominence of fluid collection identified posterior to the medial malleolus in the region of the posterior tibialis tendon that could be consistent with synovitis versus a ganglion  cyst.  He has marked tenderness in this area without erythema or warmth.  He can ambulate weightbearing as tolerated in the office today without a limp.  He can toe raise on the right side with some discomfort    X-rays performed of the right foot reveal joint spaces are reasonably well-maintained.  No evidence of a fracture    Assessment: Synovitis posterior tibialis tendon right ankle/foot    Plan: I suggested the patient be placed in a rocker-bottom fracture boot for the next 2 to 3 weeks and limit his activities.  Topically he can use Voltaren gel.  Because of the significant inflammation we have suggested trialing a Medrol Dosepak.  He has been referred back to Dr. Khan the foot and ankle specialist for evaluation.          Current Outpatient Medications:     fenofibrate (Triglide) 160 mg tablet, Take 1 tablet (160 mg) by mouth once daily., Disp: 90 tablet, Rfl: 3    lisinopril 20 mg tablet, Take 1 tablet (20 mg) by mouth once daily., Disp: 90 tablet, Rfl: 3    omeprazole (PriLOSEC) 20 mg DR capsule, Take 1 capsule (20 mg) by mouth once daily in the morning. Take before meals., Disp: , Rfl:     cholecalciferol (Vitamin D-3) 5,000 Units tablet, Take 1 tablet (5,000 Units) by mouth once daily., Disp: , Rfl:     methocarbamol (Robaxin) 500 mg tablet, Take 1 tablet (500 mg) by mouth 2 times a day for 10 days., Disp: 20 tablet, Rfl: 0

## 2024-05-31 DIAGNOSIS — M76.821 POSTERIOR TIBIAL TENDINITIS OF RIGHT LOWER EXTREMITY: ICD-10-CM

## 2024-05-31 PROCEDURE — L4361 PNEUMA/VAC WALK BOOT PRE OTS: HCPCS | Performed by: ORTHOPAEDIC SURGERY

## 2024-06-17 ENCOUNTER — APPOINTMENT (OUTPATIENT)
Dept: PRIMARY CARE | Facility: CLINIC | Age: 44
End: 2024-06-17
Payer: OTHER GOVERNMENT

## 2024-06-17 ENCOUNTER — TELEPHONE (OUTPATIENT)
Dept: PRIMARY CARE | Facility: CLINIC | Age: 44
End: 2024-06-17

## 2024-06-17 VITALS
DIASTOLIC BLOOD PRESSURE: 92 MMHG | SYSTOLIC BLOOD PRESSURE: 164 MMHG | OXYGEN SATURATION: 95 % | BODY MASS INDEX: 39.54 KG/M2 | TEMPERATURE: 97.7 F | HEIGHT: 66 IN | RESPIRATION RATE: 16 BRPM | HEART RATE: 71 BPM

## 2024-06-17 DIAGNOSIS — M25.562 CHRONIC PAIN OF BOTH KNEES: ICD-10-CM

## 2024-06-17 DIAGNOSIS — N45.1 EPIDIDYMITIS: ICD-10-CM

## 2024-06-17 DIAGNOSIS — I10 BENIGN ESSENTIAL HYPERTENSION: Primary | ICD-10-CM

## 2024-06-17 DIAGNOSIS — R21 RASH AND NONSPECIFIC SKIN ERUPTION: ICD-10-CM

## 2024-06-17 DIAGNOSIS — R19.8 CHANGE IN BOWEL MOVEMENT: ICD-10-CM

## 2024-06-17 DIAGNOSIS — G93.0 INTRACRANIAL ARACHNOID CYST: ICD-10-CM

## 2024-06-17 DIAGNOSIS — G56.90 UPPER EXTREMITY NEUROPATHY, UNSPECIFIED LATERALITY: ICD-10-CM

## 2024-06-17 DIAGNOSIS — E78.2 MIXED HYPERLIPIDEMIA: ICD-10-CM

## 2024-06-17 DIAGNOSIS — M25.571 CHRONIC PAIN OF RIGHT ANKLE: ICD-10-CM

## 2024-06-17 DIAGNOSIS — R94.31 ABNORMAL EKG: ICD-10-CM

## 2024-06-17 DIAGNOSIS — M25.561 CHRONIC PAIN OF BOTH KNEES: ICD-10-CM

## 2024-06-17 DIAGNOSIS — Z00.00 ANNUAL PHYSICAL EXAM: ICD-10-CM

## 2024-06-17 DIAGNOSIS — L84 FOOT CALLUS: ICD-10-CM

## 2024-06-17 DIAGNOSIS — K22.70 BARRETT'S ESOPHAGUS WITH ESOPHAGITIS: ICD-10-CM

## 2024-06-17 DIAGNOSIS — K20.90 BARRETT'S ESOPHAGUS WITH ESOPHAGITIS: ICD-10-CM

## 2024-06-17 DIAGNOSIS — G89.29 CHRONIC PAIN OF BOTH KNEES: ICD-10-CM

## 2024-06-17 DIAGNOSIS — G89.29 CHRONIC PAIN OF RIGHT ANKLE: ICD-10-CM

## 2024-06-17 DIAGNOSIS — E66.01 CLASS 2 SEVERE OBESITY DUE TO EXCESS CALORIES WITH SERIOUS COMORBIDITY AND BODY MASS INDEX (BMI) OF 39.0 TO 39.9 IN ADULT (MULTI): ICD-10-CM

## 2024-06-17 PROBLEM — R82.90 ABNORMAL URINE ODOR: Status: RESOLVED | Noted: 2023-02-18 | Resolved: 2024-06-17

## 2024-06-17 PROBLEM — R93.2 ABNORMAL ULTRASOUND OF LIVER: Status: RESOLVED | Noted: 2023-02-18 | Resolved: 2024-06-17

## 2024-06-17 PROBLEM — R10.11 RIGHT UPPER QUADRANT ABDOMINAL PAIN: Status: RESOLVED | Noted: 2023-02-18 | Resolved: 2024-06-17

## 2024-06-17 PROBLEM — L98.9 SKIN LESIONS: Status: RESOLVED | Noted: 2023-02-18 | Resolved: 2024-06-17

## 2024-06-17 PROCEDURE — 99396 PREV VISIT EST AGE 40-64: CPT | Performed by: FAMILY MEDICINE

## 2024-06-17 PROCEDURE — 99214 OFFICE O/P EST MOD 30 MIN: CPT | Performed by: FAMILY MEDICINE

## 2024-06-17 PROCEDURE — 3080F DIAST BP >= 90 MM HG: CPT | Performed by: FAMILY MEDICINE

## 2024-06-17 PROCEDURE — 3077F SYST BP >= 140 MM HG: CPT | Performed by: FAMILY MEDICINE

## 2024-06-17 PROCEDURE — 3008F BODY MASS INDEX DOCD: CPT | Performed by: FAMILY MEDICINE

## 2024-06-17 RX ORDER — METOPROLOL SUCCINATE 25 MG/1
25 TABLET, EXTENDED RELEASE ORAL DAILY
Qty: 30 TABLET | Refills: 5 | Status: SHIPPED | OUTPATIENT
Start: 2024-06-17 | End: 2024-12-14

## 2024-06-17 ASSESSMENT — ENCOUNTER SYMPTOMS
CHEST TIGHTNESS: 0
ABDOMINAL PAIN: 0
CONFUSION: 0
OCCASIONAL FEELINGS OF UNSTEADINESS: 0
NAUSEA: 0
BLOOD IN STOOL: 0
ROS SKIN COMMENTS: FOOT CALLUS
CHILLS: 0
PALPITATIONS: 0
FEVER: 0
SHORTNESS OF BREATH: 0
ARTHRALGIAS: 0
RECTAL PAIN: 0
NUMBNESS: 1
DEPRESSION: 0
LOSS OF SENSATION IN FEET: 0

## 2024-06-17 ASSESSMENT — COLUMBIA-SUICIDE SEVERITY RATING SCALE - C-SSRS
2. HAVE YOU ACTUALLY HAD ANY THOUGHTS OF KILLING YOURSELF?: NO
1. IN THE PAST MONTH, HAVE YOU WISHED YOU WERE DEAD OR WISHED YOU COULD GO TO SLEEP AND NOT WAKE UP?: NO
6. HAVE YOU EVER DONE ANYTHING, STARTED TO DO ANYTHING, OR PREPARED TO DO ANYTHING TO END YOUR LIFE?: NO

## 2024-06-17 ASSESSMENT — PATIENT HEALTH QUESTIONNAIRE - PHQ9
1. LITTLE INTEREST OR PLEASURE IN DOING THINGS: NOT AT ALL
2. FEELING DOWN, DEPRESSED OR HOPELESS: NOT AT ALL
SUM OF ALL RESPONSES TO PHQ9 QUESTIONS 1 AND 2: 0

## 2024-06-17 NOTE — ASSESSMENT & PLAN NOTE
Patient today for annual checkup states he plans on getting blood work completed in the next couple of weeks.  And has a list of issues he would like to address.

## 2024-06-17 NOTE — ASSESSMENT & PLAN NOTE
Previous CT scans and imaging studies of the head reviewed with patient.  Based on previous study this appears stable.  Offered referral to neurosurgery for second opinion he declined he states that he will get it addressed to the  as he transitions to group home and has exit physicals.

## 2024-06-17 NOTE — ASSESSMENT & PLAN NOTE
Cardiology referral given abnormalities noted on recent EKG along with other risk factors such as hypertension and hyperlipidemia and obesity

## 2024-06-17 NOTE — PROGRESS NOTES
Subjective   Patient ID: Ralph Shelton is a 44 y.o. male who presents for Annual Exam (Physical exam ).    HPI   Patient today for annual checkup and has a list of issues he would like to address.  Also plans on getting his blood work done within the next couple weeks.  He states earlier this year he was seen in urgent care had an EKG and there is noted some abnormalities of possible ischemia he subsequent was sent to the ER for evaluation workup in the ER did not reveal any acute issues and he was released.  Denies chest pain denies shortness of breath.  He was seen at urgent care for right ankle pain which is persisting and he would like referral to Dr. Khan for further evaluation.  Next he has been experiencing numbness and tingling in his hands and fingers varies throughout the day.  Also request podiatry referral for callus on his right foot.  Next complains of pain in both his kneecaps when exercising cannot really pinpoint of a definitive trigger.  He is also been noticing inconsistency with his stool patterns with stool consistency and frequency varying.  He denies abdominal pain.  Also since he has been in last he was evaluated by GI had an EGD performed was diagnosed with Rollins's esophagus.  Is on omeprazole.  He states that few years ago he had epididymitis ever since then he seems to get recurrent cases of epididymitis once or twice a year he would like to have urology evaluation.  Finally he states he continues with CPAP and see sleep medicine specialist although he says his current machine needs repairs he is going to reach out to his sleep medicine doctor to help facilitate.  Finally states he would like to see dermatology regarding rash she gets periodically in his thighs.  Review of Systems   Constitutional:  Negative for chills and fever.   HENT:  Positive for tinnitus. Negative for congestion and ear pain.    Eyes:  Negative for visual disturbance.   Respiratory:  Negative for chest  "tightness and shortness of breath.    Cardiovascular:  Negative for chest pain and palpitations.   Gastrointestinal:  Negative for abdominal pain, blood in stool, nausea and rectal pain.        Varying stool consistency and frequency   Musculoskeletal:  Negative for arthralgias.        Bilateral knee pain right ankle pain   Skin:  Positive for rash. Negative for pallor.        Foot callus   Neurological:  Positive for numbness.        Numbness tingling in hands and fingers   Psychiatric/Behavioral:  Negative for confusion.        Objective   BP (!) 164/92 (BP Location: Left arm, Patient Position: Sitting, BP Cuff Size: Large adult)   Pulse 71   Temp 36.5 °C (97.7 °F) (Temporal)   Resp 16   Ht 1.676 m (5' 6\")   SpO2 95%   BMI 39.54 kg/m²     Physical Exam  Vitals and nursing note reviewed.   Constitutional:       General: He is not in acute distress.     Appearance: Normal appearance. He is not ill-appearing.   HENT:      Head: Normocephalic and atraumatic.      Right Ear: Tympanic membrane, ear canal and external ear normal.      Left Ear: Tympanic membrane, ear canal and external ear normal.      Mouth/Throat:      Pharynx: Oropharynx is clear.   Eyes:      Extraocular Movements: Extraocular movements intact.   Cardiovascular:      Rate and Rhythm: Normal rate and regular rhythm.      Pulses: Normal pulses.      Heart sounds: Normal heart sounds.   Pulmonary:      Effort: Pulmonary effort is normal.      Breath sounds: Normal breath sounds.   Abdominal:      General: Abdomen is flat. Bowel sounds are normal. There is no distension.      Palpations: Abdomen is soft.      Tenderness: There is no abdominal tenderness. There is no guarding or rebound.   Musculoskeletal:         General: No swelling or tenderness. Normal range of motion.      Cervical back: Neck supple.      Comments: Handgrips equal bilaterally negative Tinel's sign upper extremity strength equal bilaterally   Skin:     General: Skin is warm.     "  Findings: No rash.   Neurological:      Mental Status: He is alert and oriented to person, place, and time. Mental status is at baseline.   Psychiatric:         Mood and Affect: Mood normal.     Patient to get fasting lab work completed    Referrals placed for cardiology, orthopedics, podiatry, gastroenterology, urology, and dermatology.    BP not to goal add metoprolol succinate XL 25 mg nightly.  Continue lisinopril.    X-ray of cervical spine and bilateral upper extremity EMG    Return to office in 8 weeks to recheck blood pressure review lab work and specialty input and reassess his case.    Assessment/Plan

## 2024-06-17 NOTE — TELEPHONE ENCOUNTER
Patient's wife called in and stated that she is very concerned about her 's diet. The patient i scheduled for his annual visit today . Mrs. Shelton is concerned because her  consumes a lot of sugar and sugary beverages. Mrs. Shelton would like for you to speak with him about his diet. Mrs. Shelton also wants you to keep it discreet that she reached out about this

## 2024-06-17 NOTE — ASSESSMENT & PLAN NOTE
Symptomatically stable on omeprazole continue to follow with gastroenterology he understands the importance of this as well as that this is considered premalignant condition.

## 2024-07-02 DIAGNOSIS — M25.571 RIGHT ANKLE PAIN, UNSPECIFIED CHRONICITY: ICD-10-CM

## 2024-07-11 ENCOUNTER — APPOINTMENT (OUTPATIENT)
Dept: ORTHOPEDIC SURGERY | Facility: CLINIC | Age: 44
End: 2024-07-11
Payer: OTHER GOVERNMENT

## 2024-07-11 ENCOUNTER — HOSPITAL ENCOUNTER (OUTPATIENT)
Dept: RADIOLOGY | Facility: CLINIC | Age: 44
Discharge: HOME | End: 2024-07-11
Payer: OTHER GOVERNMENT

## 2024-07-11 VITALS — HEIGHT: 66 IN | BODY MASS INDEX: 39.37 KG/M2 | WEIGHT: 245 LBS

## 2024-07-11 DIAGNOSIS — G89.29 CHRONIC PAIN OF RIGHT ANKLE: ICD-10-CM

## 2024-07-11 DIAGNOSIS — M25.571 CHRONIC PAIN OF RIGHT ANKLE: ICD-10-CM

## 2024-07-11 DIAGNOSIS — G89.29 CHRONIC PAIN OF BOTH KNEES: ICD-10-CM

## 2024-07-11 DIAGNOSIS — M25.562 CHRONIC PAIN OF BOTH KNEES: ICD-10-CM

## 2024-07-11 DIAGNOSIS — M25.571 RIGHT ANKLE PAIN, UNSPECIFIED CHRONICITY: ICD-10-CM

## 2024-07-11 DIAGNOSIS — M25.561 CHRONIC PAIN OF BOTH KNEES: ICD-10-CM

## 2024-07-11 PROCEDURE — 99214 OFFICE O/P EST MOD 30 MIN: CPT | Performed by: SPECIALIST

## 2024-07-11 PROCEDURE — 73610 X-RAY EXAM OF ANKLE: CPT | Mod: RIGHT SIDE | Performed by: RADIOLOGY

## 2024-07-11 PROCEDURE — 3008F BODY MASS INDEX DOCD: CPT | Performed by: SPECIALIST

## 2024-07-11 PROCEDURE — 73610 X-RAY EXAM OF ANKLE: CPT | Mod: RT

## 2024-07-11 ASSESSMENT — PAIN - FUNCTIONAL ASSESSMENT: PAIN_FUNCTIONAL_ASSESSMENT: NO/DENIES PAIN

## 2024-07-11 NOTE — PROGRESS NOTES
Pt was having sharp pains and swelling right medial ankle about 2 months ago and went to a urgent care. Pt reports no Hx of Sx, injections, or trauma. His pain and swelling has subsided but he feels as if there is something stuck on the medial side of his ankle joint. XR done today.  Pain is fully resolved and he has no constitutional symptoms.  He was wondering if there is any relation to a bug bite in the same region that occurred over a year ago.  Referred by Tiago Esparza M.D.    Exam: Right medial ankle has nonerythematous area of swelling.  Almost feels like a lipomatous mass to the medial retrocalcaneal/ankle region.  To palpation there is no firmness no pain no warmth or erythema.  He is able to single stance heel rise on the right no pain to a normal functioning post tib tendon.  No pain with full passive motion of the ankle and subtalar region.  Dermis intact neurovascular intact negative Homans.  Radiographs: 3 view standing right ankle show absolutely no abnormalities.    Assessment plan: Right medial retrocalcaneal benign mass.  Possible lipoma.  Advised to see me again in 3 months for simple reexamination of the region no x-rays if doing well.  Obviously if there are changes in his condition he can come in anytime.

## 2024-08-16 ENCOUNTER — APPOINTMENT (OUTPATIENT)
Dept: PODIATRY | Facility: CLINIC | Age: 44
End: 2024-08-16
Payer: OTHER GOVERNMENT

## 2024-08-19 ENCOUNTER — APPOINTMENT (OUTPATIENT)
Dept: PRIMARY CARE | Facility: CLINIC | Age: 44
End: 2024-08-19
Payer: OTHER GOVERNMENT

## 2024-08-19 VITALS
TEMPERATURE: 97.7 F | DIASTOLIC BLOOD PRESSURE: 82 MMHG | BODY MASS INDEX: 38.99 KG/M2 | HEART RATE: 76 BPM | RESPIRATION RATE: 16 BRPM | WEIGHT: 242.6 LBS | OXYGEN SATURATION: 95 % | SYSTOLIC BLOOD PRESSURE: 130 MMHG | HEIGHT: 66 IN

## 2024-08-19 DIAGNOSIS — E78.2 MIXED HYPERLIPIDEMIA: ICD-10-CM

## 2024-08-19 DIAGNOSIS — I10 BENIGN ESSENTIAL HYPERTENSION: Primary | ICD-10-CM

## 2024-08-19 DIAGNOSIS — G56.90 UPPER EXTREMITY NEUROPATHY, UNSPECIFIED LATERALITY: ICD-10-CM

## 2024-08-19 DIAGNOSIS — G89.29 CHRONIC PAIN OF RIGHT ANKLE: ICD-10-CM

## 2024-08-19 DIAGNOSIS — M25.571 CHRONIC PAIN OF RIGHT ANKLE: ICD-10-CM

## 2024-08-19 PROCEDURE — 3079F DIAST BP 80-89 MM HG: CPT | Performed by: FAMILY MEDICINE

## 2024-08-19 PROCEDURE — 3075F SYST BP GE 130 - 139MM HG: CPT | Performed by: FAMILY MEDICINE

## 2024-08-19 PROCEDURE — 99213 OFFICE O/P EST LOW 20 MIN: CPT | Performed by: FAMILY MEDICINE

## 2024-08-19 PROCEDURE — 3008F BODY MASS INDEX DOCD: CPT | Performed by: FAMILY MEDICINE

## 2024-08-19 ASSESSMENT — ENCOUNTER SYMPTOMS
PALPITATIONS: 0
ARTHRALGIAS: 0
CHILLS: 0
SHORTNESS OF BREATH: 0
ABDOMINAL PAIN: 0
CHEST TIGHTNESS: 0
FEVER: 0
CONFUSION: 0

## 2024-08-19 NOTE — PROGRESS NOTES
"Subjective   Patient ID: Ralph Shelton is a 44 y.o. male who presents for Follow-up (2 month follow up on labs ).    HPI patient today for follow-up he did not get any of his labs done but will do so in the near future and he has appointments with his various specialist coming up over the next 6 to 8 weeks.  He did get into the see orthopedics regarding chronic ankle pain x-rays were unremarkable for acute issue when he understands they feel may be more of a bursitis issue and they are going to monitor him conservatively for now he says it is feeling better.    Review of Systems   Constitutional:  Negative for chills and fever.   HENT:  Negative for congestion and ear pain.    Eyes:  Negative for visual disturbance.   Respiratory:  Negative for chest tightness and shortness of breath.    Cardiovascular:  Negative for chest pain and palpitations.   Gastrointestinal:  Negative for abdominal pain.   Musculoskeletal:  Negative for arthralgias.   Skin:  Negative for pallor.   Psychiatric/Behavioral:  Negative for confusion.        Objective   /82 (BP Location: Right arm, Patient Position: Sitting, BP Cuff Size: Large adult)   Pulse 76   Temp 36.5 °C (97.7 °F) (Temporal)   Resp 16   Ht 1.676 m (5' 6\")   Wt 110 kg (242 lb 9.6 oz)   SpO2 95%   BMI 39.16 kg/m²     Physical Exam  Vitals and nursing note reviewed.   Constitutional:       General: He is not in acute distress.     Appearance: Normal appearance. He is not ill-appearing.   HENT:      Head: Normocephalic and atraumatic.      Right Ear: Tympanic membrane, ear canal and external ear normal.      Left Ear: Tympanic membrane, ear canal and external ear normal.      Mouth/Throat:      Pharynx: Oropharynx is clear.   Eyes:      Extraocular Movements: Extraocular movements intact.   Cardiovascular:      Rate and Rhythm: Normal rate and regular rhythm.      Pulses: Normal pulses.      Heart sounds: Normal heart sounds.   Pulmonary:      Effort: Pulmonary " effort is normal.      Breath sounds: Normal breath sounds.   Abdominal:      General: Abdomen is flat. Bowel sounds are normal.      Palpations: Abdomen is soft.      Tenderness: There is no abdominal tenderness.   Musculoskeletal:         General: Normal range of motion.      Cervical back: Neck supple.   Skin:     General: Skin is warm.   Neurological:      Mental Status: He is alert and oriented to person, place, and time. Mental status is at baseline.   Psychiatric:         Mood and Affect: Mood normal.     Patient to get lab work completed, upper extremity EMG and cervical spine x-ray completed call for results.  Follow through with his various specialist    Continue to work on lifestyle modifications regards to diet exercise and weight loss    Return to office 3 and half months with repeat fasting labs      Assessment/Plan   Problem List Items Addressed This Visit             ICD-10-CM    Benign essential hypertension - Primary I10     BP improved continue current medication         Relevant Orders    Follow Up In Primary Care - Established    Hyperlipidemia E78.5     Check fasting lipids continue fenofibrate         Relevant Orders    Follow Up In Primary Care - Established    Chronic pain of right ankle M25.571, G89.29     X-ray and orthopedic report reviewed with patient for now they are going to observe as it is doing better feels it may be underlying bursitis issue.         Upper extremity neuropathy, unspecified laterality G56.90     Patient get x-ray of cervical spine and EMG completed

## 2024-08-19 NOTE — ASSESSMENT & PLAN NOTE
X-ray and orthopedic report reviewed with patient for now they are going to observe as it is doing better feels it may be underlying bursitis issue.

## 2024-08-29 ENCOUNTER — OFFICE VISIT (OUTPATIENT)
Dept: SLEEP MEDICINE | Facility: CLINIC | Age: 44
End: 2024-08-29
Payer: COMMERCIAL

## 2024-08-29 VITALS
HEIGHT: 66 IN | HEART RATE: 74 BPM | SYSTOLIC BLOOD PRESSURE: 159 MMHG | BODY MASS INDEX: 39.21 KG/M2 | RESPIRATION RATE: 16 BRPM | WEIGHT: 244 LBS | TEMPERATURE: 97.7 F | OXYGEN SATURATION: 98 % | DIASTOLIC BLOOD PRESSURE: 85 MMHG

## 2024-08-29 DIAGNOSIS — I10 BENIGN ESSENTIAL HYPERTENSION: ICD-10-CM

## 2024-08-29 DIAGNOSIS — G47.33 OSA ON CPAP: Primary | ICD-10-CM

## 2024-08-29 DIAGNOSIS — E66.9 OBESITY (BMI 30-39.9): ICD-10-CM

## 2024-08-29 PROCEDURE — 3077F SYST BP >= 140 MM HG: CPT | Performed by: INTERNAL MEDICINE

## 2024-08-29 PROCEDURE — 3079F DIAST BP 80-89 MM HG: CPT | Performed by: INTERNAL MEDICINE

## 2024-08-29 PROCEDURE — 3008F BODY MASS INDEX DOCD: CPT | Performed by: INTERNAL MEDICINE

## 2024-08-29 PROCEDURE — 99214 OFFICE O/P EST MOD 30 MIN: CPT | Performed by: INTERNAL MEDICINE

## 2024-08-29 ASSESSMENT — SLEEP AND FATIGUE QUESTIONNAIRES
HOW LIKELY ARE YOU TO NOD OFF OR FALL ASLEEP WHILE WATCHING TV: SLIGHT CHANCE OF DOZING
SITING INACTIVE IN A PUBLIC PLACE LIKE A CLASS ROOM OR A MOVIE THEATER: SLIGHT CHANCE OF DOZING
HOW LIKELY ARE YOU TO NOD OFF OR FALL ASLEEP WHILE SITTING QUIETLY AFTER LUNCH WITHOUT ALCOHOL: WOULD NEVER DOZE
HOW LIKELY ARE YOU TO NOD OFF OR FALL ASLEEP IN A CAR, WHILE STOPPED FOR A FEW MINUTES IN TRAFFIC: WOULD NEVER DOZE
HOW LIKELY ARE YOU TO NOD OFF OR FALL ASLEEP WHILE SITTING AND TALKING TO SOMEONE: WOULD NEVER DOZE
HOW LIKELY ARE YOU TO NOD OFF OR FALL ASLEEP WHILE LYING DOWN TO REST IN THE AFTERNOON WHEN CIRCUMSTANCES PERMIT: SLIGHT CHANCE OF DOZING
HOW LIKELY ARE YOU TO NOD OFF OR FALL ASLEEP WHILE SITTING AND READING: SLIGHT CHANCE OF DOZING
ESS-CHAD TOTAL SCORE: 5
HOW LIKELY ARE YOU TO NOD OFF OR FALL ASLEEP WHEN YOU ARE A PASSENGER IN A CAR FOR AN HOUR WITHOUT A BREAK: SLIGHT CHANCE OF DOZING

## 2024-08-29 NOTE — PROGRESS NOTES
"[facility] SLEEP MEDICINE CLINIC  FOLLOW-UP VISIT NOTE    PCP: iTago Esparza MD    HISTORY OF PRESENT ILLNESS     Patient ID: Ralph Shelton is a 44 y.o. male who presents for follow-up of ZONIA on PAP, yearly checkup.     Patient is here alone today.  To review, patient's medical history is notable for obesity (BMI 39), HTN, back pain, vitamin D deficiency, and moderate ZONIA on CPAP     Interval History  Patient was last seen in 9/14/2023. Plan was to continue PAP and follow-up yearly.  Since last visit, patient has been using machine every night but not getting to 4 hours usage due to malfuncitoning built-in humidifier of his CPAP machine. Per patient, when he turned on machine, the screen says \"Heating system error\" and when he tried to use it overnight, the water level in  water tank barely drop in the morning to the level where it used to drop. Subsequently, he would severe dry mouth and would rip off mask.   Patient denies perceived mask leak, air hunger, aerophagia, skin irritation, and nasal congestion.   Currently, he using F&P Piter FFM which he liked it very much and is able to stay on the whole night.   The following are patient's perceived benefits of PAP: decreased snoring / choking / gasping, refreshing sleep, decreased daytime sleepiness and/or fatigue, and decreased nocturnal awakening    RLS Follow-up:   Denies    SLEEP STUDY HISTORY (personally reviewed raw data such as interpretation report, data sheet, hypnogram, and titration table if available and applicable)  PSG 11/23/2012: AHI 22, SpO2 shania 75%, PLM index 2.1 (weight 219 lbs)  Split-night PSG 5/6/2021: pre-PAP AHI 86.5, supine .8, REM , SpO2 shania 69%, spent 31.9 mins with SpO2<88%. CPAP was started at 6-17 cm H2O. At CPAP 17 cm H2O with supine REM sleep, AHI 2.1, SpO2 shania 92%. PLM index 18.1     SLEEP-WAKE SCHEDULE  Bedtime:  11 PM to 11:30 PM daily  Subjective sleep latency: less than 5 minutes  Number of " awakenings:  2-3 times per night spontaneously for unknown reasons without CPAP. With CPAP, patient sleeps thru the night  Final wake time:  6:24 AM on weekdays, 8:30 AM to 9 AM on weekends  Out of bed time:  6:24 AM on weekdays, 8:30 AM to 9 AM on weekends  Shift work: No   Naps: no napping on CPAP. Have the urge to nap without CPAP  Average sleep duration (excluding naps): 5.5 to 6.5 hours    SLEEP ENVIRONMENT  Sleep location: bed  Sleep status: sleeps with wife  Preferred sleep position: side    SOCIAL HISTORY  Smoking: no  ETOH: no  Marijuana: no  Caffeine: 2 bottles pop daily  Sleep aids: no    WEIGHT: stable    Claustrophobia: No     REVIEW OF SYSTEMS     All other systems have been reviewed and are negative.    ALLERGIES     Allergies   Allergen Reactions    Amoxicillin Hives       MEDICATIONS     Current Outpatient Medications   Medication Sig Dispense Refill    cholecalciferol (Vitamin D-3) 5,000 Units tablet Take 1 tablet (5,000 Units) by mouth once daily.      fenofibrate (Triglide) 160 mg tablet Take 1 tablet (160 mg) by mouth once daily. 90 tablet 3    lisinopril 20 mg tablet Take 1 tablet (20 mg) by mouth once daily. 90 tablet 3    metoprolol succinate XL (Toprol-XL) 25 mg 24 hr tablet Take 1 tablet (25 mg) by mouth once daily. Do not crush or chew. 30 tablet 5    omeprazole (PriLOSEC) 20 mg DR capsule Take 1 capsule (20 mg) by mouth once daily in the morning. Take before meals.      methocarbamol (Robaxin) 500 mg tablet Take 1 tablet (500 mg) by mouth 2 times a day for 10 days. (Patient not taking: Reported on 6/17/2024) 20 tablet 0     No current facility-administered medications for this visit.       Active Problems, Allergy List, Medication List, and PMH/PSH/FH/Social Hx have been reviewed and reconciled in chart. No significant changes unless documented in the pertinent chart section. Updates made when necessary.       PHYSICAL EXAM     VITAL SIGNS: /85   Pulse 74   Temp 36.5 °C (97.7 °F)   " Resp 16   Ht 1.676 m (5' 6\")   Wt 111 kg (244 lb)   SpO2 98%   BMI 39.38 kg/m²     NECK CIRCUMFERENCE: 16.5 inches    Today ESS: 5  Last visit ESS: 2  KENNETH: 11    Physical Exam  Constitutional: Awake, not in distress  Skin: Warm, no rash  Neuro: No tremors, moves all extremities  Psych: alert and oriented to time, place, and person    RESULTS/DATA     Iron (ug/dL)   Date Value   05/09/2022 76     Iron Saturation (%)   Date Value   05/09/2022 20 (L)     TIBC (ug/dL)   Date Value   05/09/2022 377     Ferritin (ug/L)   Date Value   05/09/2022 355 (H)       Bicarbonate   Date Value Ref Range Status   03/11/2024 24 21 - 32 mmol/L Final       PAP Adherence  A PAP adherence data was obtained and reviewed personally today in clinic. (see scanned document in EPIC)      ASSESSMENT/PLAN     Ralph Shelton is a 44 y.o. male who returns in [facility] for the following problems:    OBSTRUCTIVE SLEEP APNEA, SEVERE positional (pre-PAP AHI 86.5)  SLEEP-RELATED HYPOXEMIA  - Now on auto-CPAP 15-20 cm H2O and EPR 3  - PAP adherence data reviewed today. Usage days 8/30, days> 4 hours at 27%, residual AHI 2.9.   - Patient reports improvement of ZONIA symptoms.   - Renew PAP supplies. Order placed and sent to [facility].   - Noted the following issue on machine: malfunctioning heating system/ built-in humidifer. Recommend repair or replace machine. If replacing machine due to machine issues beyond repair, recommend getting a new machine such as auto-CPAP at the following settings: auto-CPAP 15-20 cm H2O with EPR 3, heated humidification, heated tubings, and mask of preference. PAP order sent to [facility].    - Continue supportive management as follows: Lose weight. Stay off your back when sleeping. Avoid smoking, alcohol, and sedating medications if applicable. Don't drive when sleepy.    OBESITY  - Recommend weight loss with diet and exercise.  - Weight loss can help in " long term management of ZONIA.  - Defer management to PCP.    HYPERTENSION  - BP slightly high today. Untreated  or inadequately treated sleep apnea can lead to new onset hypertension, resistant hypertension, or refractory hypertension.  - Continue anti-hypertensive medications per PCP.  - Supportive management: low salt DASH diet (less than 2000 mg sodium intake daily), moderate intensity aerobic exercise at least 30 minutes 5 days per week, reduce stress, quit smoking, limit alcohol, lose weight, and monitor BP once daily  - PCP following. Defer management to PCP.      Follow-up in 31-90 days after getting new machine.    All of patient's questions were answered. He verbalizes understanding and agreement with my assessment and plan. Refer to after-visit-summary (AVS) for patient education and if applicable, for more details on sleep study preparation, troubleshooting issues with PAP usage, proper cleaning instructions of PAP supplies, and usual recommended replacement schedule for each of the PAP supplies.

## 2024-09-27 ENCOUNTER — APPOINTMENT (OUTPATIENT)
Dept: PODIATRY | Facility: CLINIC | Age: 44
End: 2024-09-27
Payer: OTHER GOVERNMENT

## 2024-09-27 VITALS — BODY MASS INDEX: 39.38 KG/M2 | RESPIRATION RATE: 16 BRPM | HEIGHT: 66 IN

## 2024-09-27 DIAGNOSIS — M77.9 CAPSULITIS: Primary | ICD-10-CM

## 2024-09-27 DIAGNOSIS — L84 CORNS AND CALLOSITIES: ICD-10-CM

## 2024-09-27 DIAGNOSIS — M79.671 RIGHT FOOT PAIN: ICD-10-CM

## 2024-09-27 DIAGNOSIS — L84 FOOT CALLUS: ICD-10-CM

## 2024-09-27 PROCEDURE — 99202 OFFICE O/P NEW SF 15 MIN: CPT | Performed by: PODIATRIST

## 2024-09-27 PROCEDURE — 11055 PARING/CUTG B9 HYPRKER LES 1: CPT | Performed by: PODIATRIST

## 2024-09-27 NOTE — PROGRESS NOTES
CC: right foot pain    HPI:  Patient seen for pain and callus right foot, no injurym feels like a splinter in the foot, present for a period of time, no soi present.    PCP: Dr. Esparza  Last visit: 24     PMH  Past Medical History:   Diagnosis Date    GERD (gastroesophageal reflux disease)     High triglycerides     Hypertension     Personal history of COVID-19     History of COVID-19     MEDS    Current Outpatient Medications:     cholecalciferol (Vitamin D-3) 5,000 Units tablet, Take 1 tablet (5,000 Units) by mouth once daily., Disp: , Rfl:     fenofibrate (Triglide) 160 mg tablet, Take 1 tablet (160 mg) by mouth once daily., Disp: 90 tablet, Rfl: 3    lisinopril 20 mg tablet, Take 1 tablet (20 mg) by mouth once daily., Disp: 90 tablet, Rfl: 3    methocarbamol (Robaxin) 500 mg tablet, Take 1 tablet (500 mg) by mouth 2 times a day for 10 days. (Patient not taking: Reported on 2024), Disp: 20 tablet, Rfl: 0    metoprolol succinate XL (Toprol-XL) 25 mg 24 hr tablet, Take 1 tablet (25 mg) by mouth once daily. Do not crush or chew., Disp: 30 tablet, Rfl: 5    omeprazole (PriLOSEC) 20 mg DR capsule, Take 1 capsule (20 mg) by mouth once daily in the morning. Take before meals., Disp: , Rfl:   Allergies  Allergies   Allergen Reactions    Amoxicillin Hives     Social History     Socioeconomic History    Marital status:    Tobacco Use    Smoking status: Former     Current packs/day: 0.00     Types: Cigarettes     Quit date: 3/15/2024     Years since quittin.5     Passive exposure: Past    Smokeless tobacco: Never   Vaping Use    Vaping status: Never Used   Substance and Sexual Activity    Alcohol use: Not Currently    Drug use: Never    Sexual activity: Yes     Partners: Female     Family History   Problem Relation Name Age of Onset    Other (CARDIAC DISORDER) Mother      Diabetes Mother      Kidney disease Mother      Liver disease Mother      Other (CVA) Brother      Deep vein thrombosis Brother       "Lung cancer Brother      Brain cancer Brother       Past Surgical History:   Procedure Laterality Date    OTHER SURGICAL HISTORY  05/19/2022    [place] tooth extraction    OTHER SURGICAL HISTORY  05/19/2022    Tongue surgery    OTHER SURGICAL HISTORY  05/19/2022    Vasectomy    OTHER SURGICAL HISTORY  03/23/2021    Nasal septoplasty       REVIEW OF SYSTEMS    + as noted above in HPI.       Physical examination:   On General Observation: Patient is a pleasant, cooperative, well developed 44 y.o.  adult male. The patient is alert and oriented to time, place and person. Patient has normal affect and mood.  Resp 16   Ht 1.676 m (5' 6\")   BMI 39.38 kg/m²     Vascular:  DP and PT pulses are 2/4 b/l.  no edema noted. no varicosities b/l.  CFT  5 seconds to all digits bilateral.  Skin temperature is warm to warm from proximal to distal bilateral.      Muscular: Strength is 5/5 b/l.  Motor strength is normal and symmetric proximally, as well as distally, in all four extremities. Good mobility of all extremities.  Tenderness to plantar 5th metatarsal head.     Neuro:  Proprioception present.   Sensation to vibration is  intact. Protective sensation present  at all pedal sites via [place] Demetri 5.07 monofilament bilateral.  Light touch present bilateral.     Derm: seed callus is present sub 5th metatarsal head    Ortho:  Rom is stable 1st mpj, mtj, stj, aj, pain is present base of the 5th metatarsal head      ASSESSMENT:    Capsulitis right foot  Callus right foot  Pain right foot     PLAN:   Consult  A comprehensive history and physical examination were preformed. The patient was educated on clinical findings, diagnosis and treatment plans. Patient understands all that has been explained and all questions were answered to apparent satisfaction.   -Reviewed etiology and treatment options with pt, recommend orthotics to offload the joint, will order xrays of the foot.  -Debrided the callus right foot with 15 " blade.        Shaw Robledo DPM

## 2024-10-10 ENCOUNTER — APPOINTMENT (OUTPATIENT)
Dept: ORTHOPEDIC SURGERY | Facility: CLINIC | Age: 44
End: 2024-10-10
Payer: OTHER GOVERNMENT

## 2024-10-15 ENCOUNTER — TELEPHONE (OUTPATIENT)
Dept: PRIMARY CARE | Facility: CLINIC | Age: 44
End: 2024-10-15
Payer: OTHER GOVERNMENT

## 2024-10-15 NOTE — TELEPHONE ENCOUNTER
-PRIMARY  CPT  X2  DX M77.9 ARE NOT A COVERED BENEFIT.   MUST BE ACTIVE DUTY AND DIABETIC TO BE COVERED.    [facility]  [phone]  PER: LONDON AMOR  EFFECTIVE 8/1/23  CPT  X2  DX M77.9 ARE NOT A COVERED BENEFIT W PLAN  EXCLUSIONS APPLY  PATIENT HAS MEDICAL AND HEARING BENEFITS ONLY. NO DME COVERAGE W PLAN.  REFERENCE #[id number]    TRIED TO LMOM. MAILBOX IS FULL AND CANNOT RECEIVE MESSAGES.

## 2024-10-15 NOTE — TELEPHONE ENCOUNTER
----- Message from Shaw Robledo sent at 9/27/2024  1:38 PM EDT -----  Regarding: orthotics  Please check orthotics dx is m77.9 thanks

## 2024-10-17 ENCOUNTER — APPOINTMENT (OUTPATIENT)
Dept: ORTHOPEDIC SURGERY | Facility: CLINIC | Age: 44
End: 2024-10-17
Payer: OTHER GOVERNMENT

## 2024-10-21 ENCOUNTER — APPOINTMENT (OUTPATIENT)
Dept: GASTROENTEROLOGY | Facility: CLINIC | Age: 44
End: 2024-10-21
Payer: OTHER GOVERNMENT

## 2024-10-21 VITALS — WEIGHT: 245 LBS | HEART RATE: 75 BPM | BODY MASS INDEX: 39.37 KG/M2 | HEIGHT: 66 IN

## 2024-10-21 DIAGNOSIS — K22.70 BARRETT'S ESOPHAGUS WITHOUT DYSPLASIA: ICD-10-CM

## 2024-10-21 DIAGNOSIS — K21.9 GASTROESOPHAGEAL REFLUX DISEASE WITHOUT ESOPHAGITIS: ICD-10-CM

## 2024-10-21 DIAGNOSIS — R19.8 CHANGE IN BOWEL MOVEMENT: ICD-10-CM

## 2024-10-21 DIAGNOSIS — K76.0 NAFLD (NONALCOHOLIC FATTY LIVER DISEASE): Primary | ICD-10-CM

## 2024-10-21 PROCEDURE — 3008F BODY MASS INDEX DOCD: CPT | Performed by: INTERNAL MEDICINE

## 2024-10-21 PROCEDURE — 99214 OFFICE O/P EST MOD 30 MIN: CPT | Performed by: INTERNAL MEDICINE

## 2024-10-21 RX ORDER — POLYETHYLENE GLYCOL 3350, SODIUM SULFATE ANHYDROUS, SODIUM BICARBONATE, SODIUM CHLORIDE, POTASSIUM CHLORIDE 236; 22.74; 6.74; 5.86; 2.97 G/4L; G/4L; G/4L; G/4L; G/4L
4 POWDER, FOR SOLUTION ORAL ONCE
Qty: 4000 ML | Refills: 0 | Status: SHIPPED | OUTPATIENT
Start: 2024-10-21 | End: 2024-10-21

## 2024-10-21 ASSESSMENT — ENCOUNTER SYMPTOMS: SHORTNESS OF BREATH: 0

## 2024-10-21 NOTE — PROGRESS NOTES
REASON FOR VISIT:  NAFLD, GERD  PCP (requesting provider): Tiago Esparza MD.    HPI:  Ralph Shelton is a 44 y.o. male with a past medical history of ZONIA, HTN, and HLD being evaluated for NAFLD and GERD with Rollins's esophagus. MRI liver protocol showed hepatomegaly, hepatic steatosis, and simple 9 mm hepatic cyst (4/2022). 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 TTG IgA negative (5/2022). Chronic liver labs with mild elevation ASMA 1:40 of unlikely clinical significance and minimal elevation in ferritin 355 due to hepatic steatosis and not hemochromatosis given % sat not elevated.    The patient reports he has upcoming longterm and [facility]. He has been having some digestive issues. He has days where he is very regular but other days when he has small lydia of stool at a time. He notes having mucus in the stool. He has never had a colonoscopy. He has spotting of blood on the toilet paper. No significant blood in the stool or toilet bowl. No NSAIDs. He takes Omeprazole 20 mg daily with good control of reflux. He has never been plugged in with Hepatology. Weight is stable 240 to 245.     FamHx: MGM with colon cancer (age 80s). Mother with cirrhosis.    Prior Endoscopy:  -EGD (3/2023): C0M1 SSBE (biopsies showed BE, no dysplasia), mild gastric erythema (H. Pylori -), normal duodenum (normal duodenal biopsies).    PAST MEDICAL HISTORY  Past Medical History:   Diagnosis Date    GERD (gastroesophageal reflux disease)     High triglycerides     Hypertension     Personal history of COVID-19     History of COVID-19       PAST SURGICAL HISTORY  Past Surgical History:   Procedure Laterality Date    OTHER SURGICAL HISTORY  05/19/2022    [place] tooth extraction    OTHER SURGICAL HISTORY  05/19/2022    Tongue surgery    OTHER SURGICAL HISTORY  05/19/2022    Vasectomy    OTHER SURGICAL HISTORY  03/23/2021    Nasal septoplasty       FAMILY HISTORY  Family History   Problem Relation Name Age of Onset    Other  (CARDIAC DISORDER) Mother      Diabetes Mother      Kidney disease Mother      Liver disease Mother      Other (CVA) Brother      Deep vein thrombosis Brother      Lung cancer Brother      Brain cancer Brother         SOCIAL HISTORY   reports that he quit smoking about 7 months ago. His smoking use included cigarettes. He has been exposed to tobacco smoke. He has never used smokeless tobacco. He reports that he does not currently use alcohol. He reports that he does not use drugs.    REVIEW OF SYSTEMS  Review of Systems   Respiratory:  Negative for shortness of breath.    Cardiovascular:  Negative for chest pain.   All other systems reviewed and are negative.    A 10+ point review of systems was otherwise negative except as noted and per HPI.    ALLERGIES  Allergies   Allergen Reactions    Amoxicillin Hives       MEDICATIONS  Current Outpatient Medications   Medication Instructions    cholecalciferol (Vitamin D-3) 5,000 Units tablet 1 tablet, oral, Daily    fenofibrate (TRIGLIDE) 160 mg, oral, Daily    lisinopril 20 mg, oral, Daily    methocarbamol (ROBAXIN) 500 mg, oral, 2 times daily    metoprolol succinate XL (TOPROL-XL) 25 mg, oral, Daily, Do not crush or chew.    omeprazole (PRILOSEC) 20 mg, oral, Daily before breakfast       VITALS  There were no vitals filed for this visit.   There is no height or weight on file to calculate BMI.    PHYSICAL EXAM  CONSTITUTIONAL: NAD, appears stated age  EYES: anicteric sclera, sclera clear  HEAD: normocephalic, atraumatic   NECK: supple   PULMONARY: CTAB  CARDIOVASCULAR: RRR, no M/R/G appreciated   ABDOMEN: soft, NTND, +BS, no rebound or guarding   MUSCULOSKELETAL: no edema  SKIN: no jaundice   PSYCHIATRIC: AOx3, appropriate insight and judgement    LABS  WBC   Date Value   03/11/2024 6.7 x10*3/uL   10/13/2023 7.3 x10*3/uL   09/16/2022 8.1 x10E9/L   04/06/2021 7.5 x10E9/L   05/20/2019 6.9 x10E9/L     Hemoglobin (g/dL)   Date Value   03/11/2024 14.3   10/13/2023 15.1    09/16/2022 16.7   04/06/2021 16.2   05/20/2019 15.4     Platelets   Date Value   03/11/2024 195 x10*3/uL   10/13/2023 170 x10*3/uL   09/16/2022 220 x10E9/L   04/06/2021 234 x10E9/L   05/20/2019 235 x10E9/L     Sodium (mmol/L)   Date Value   03/11/2024 134 (L)   10/13/2023 135 (L)   12/19/2022 136   09/16/2022 137   05/09/2022 135 (L)     Potassium (mmol/L)   Date Value   03/11/2024 3.9   10/13/2023 3.4 (L)   12/19/2022 4.5   09/16/2022 4.4   05/09/2022 3.9     Chloride (mmol/L)   Date Value   03/11/2024 103   10/13/2023 101   12/19/2022 103   09/16/2022 101   05/09/2022 101     Bicarbonate (mmol/L)   Date Value   03/11/2024 24   10/13/2023 25   12/19/2022 27   09/16/2022 29   05/09/2022 24     Urea Nitrogen (mg/dL)   Date Value   03/11/2024 8   10/13/2023 11   12/19/2022 13   09/16/2022 14   05/09/2022 17     Creatinine (mg/dL)   Date Value   03/11/2024 0.76   10/13/2023 0.82   12/19/2022 0.86   09/16/2022 0.75   05/09/2022 0.80     Calcium (mg/dL)   Date Value   03/11/2024 9.2   10/13/2023 9.3   12/19/2022 9.6   09/16/2022 10.1   05/09/2022 9.5     Total Protein (g/dL)   Date Value   03/11/2024 6.9   10/13/2023 7.0   12/19/2022 7.1   09/16/2022 7.1   05/09/2022 6.8     Bilirubin, Total (mg/dL)   Date Value   03/11/2024 0.4   10/13/2023 0.8     Total Bilirubin (mg/dL)   Date Value   12/19/2022 0.6   09/16/2022 0.7   05/09/2022 0.7     Alkaline Phosphatase (U/L)   Date Value   03/11/2024 83   10/13/2023 69   12/19/2022 67   09/16/2022 72   05/09/2022 66     ALT (U/L)   Date Value   03/11/2024 90 (H)   10/13/2023 91 (H)     ALT (SGPT) (U/L)   Date Value   12/19/2022 79 (H)   09/16/2022 109 (H)   05/09/2022 72 (H)     AST (U/L)   Date Value   03/11/2024 55 (H)   10/13/2023 65 (H)   12/19/2022 42 (H)   09/16/2022 77 (H)   05/09/2022 44 (H)     Glucose (mg/dL)   Date Value   03/11/2024 129 (H)   10/13/2023 107 (H)   12/19/2022 96   09/16/2022 91   05/09/2022 87     Lipase (U/L)   Date Value   10/13/2023 28        ASSESSMENT/PLAN  Ralph Shelton is a 44 y.o. male with a past medical history of ZONIA, HTN, and HLD being evaluated for NAFLD and GERD with Rollins's esophagus. MRI liver protocol showed hepatomegaly, hepatic steatosis, and simple 9 mm hepatic cyst (4/2022). 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 TTG IgA negative (5/2022). Chronic liver labs with mild elevation ASMA 1:40 of unlikely clinical significance and minimal elevation in ferritin 355 due to hepatic steatosis and not hemochromatosis given % sat not elevated. However, his LFTs are persistently elevated and there is family history of cirrhosis, so we will refer him to Hepatology for further evaluation and management. GERD is well-controlled on Omeprazole. He has had change in bowel habits with occasional erratic days of increased frequency. There is a family history of colon cancer as well. We will pursue a colonoscopy.    -Plan for colonoscopy  -The procedure(s) including risks/benefits, diet restrictions, prep, and sedation were discussed with the patient  -Continue Omeprazole 20 mg daily   -Referral to Hepatology  -If colonoscopy unremarkable, then will trial a fiber supplement for IBS    Follow-up will be at the time of the colonoscopy.    Signature: Mat William MD

## 2024-10-24 ENCOUNTER — APPOINTMENT (OUTPATIENT)
Dept: ORTHOPEDIC SURGERY | Facility: CLINIC | Age: 44
End: 2024-10-24
Payer: OTHER GOVERNMENT

## 2024-10-24 NOTE — TELEPHONE ENCOUNTER
THIRD ATTEMPT TO CALL PATIENT WITH HIS ORTHOTIC BENEFITS. STILL UNABLE TO REACH. MAILBOX IS FULL. WILL WAIT FOR HIM TO REACH OUT TO US. THANK YOU!

## 2024-10-25 ENCOUNTER — HOSPITAL ENCOUNTER (OUTPATIENT)
Dept: RADIOLOGY | Facility: CLINIC | Age: 44
Discharge: HOME | End: 2024-10-25
Payer: OTHER GOVERNMENT

## 2024-10-25 DIAGNOSIS — M79.671 RIGHT FOOT PAIN: ICD-10-CM

## 2024-10-25 DIAGNOSIS — M77.9 CAPSULITIS: ICD-10-CM

## 2024-10-25 PROCEDURE — 73630 X-RAY EXAM OF FOOT: CPT | Mod: RT

## 2024-11-06 ENCOUNTER — OFFICE VISIT (OUTPATIENT)
Dept: ORTHOPEDIC SURGERY | Facility: HOSPITAL | Age: 44
End: 2024-11-06
Payer: OTHER GOVERNMENT

## 2024-11-06 VITALS — BODY MASS INDEX: 39.37 KG/M2 | HEIGHT: 66 IN | WEIGHT: 245 LBS

## 2024-11-06 DIAGNOSIS — R22.41 MASS OF RIGHT ANKLE: Primary | ICD-10-CM

## 2024-11-06 PROCEDURE — 3008F BODY MASS INDEX DOCD: CPT | Performed by: SPECIALIST

## 2024-11-06 PROCEDURE — 99213 OFFICE O/P EST LOW 20 MIN: CPT | Performed by: SPECIALIST

## 2024-11-07 NOTE — PROGRESS NOTES
Patient is here for reassessment of his right medial ankle mass.  Since his last visit he is noted no change in the size of the mass.  Occasionally he has itching quality to the skin in that region and some minimal discomfort but no hindrance of activities no rest or constitutional symptoms.    Exam: Patient alert and oriented x 3 no acute distress right foot and ankle in stance is neutral alignment compared to the left he is able to heel rise with normal strength and normal hindfoot inversion and no pain.  The mass still measures around 3 cm in diameter is relatively soft fatty feel.  No changes to the dermis neurovascular intact.    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  No significant change since last visit.  Patient will call for follow-up if he notes any change in the mass.

## 2024-11-11 ENCOUNTER — TELEPHONE (OUTPATIENT)
Dept: PRIMARY CARE | Facility: CLINIC | Age: 44
End: 2024-11-11
Payer: OTHER GOVERNMENT

## 2024-11-11 DIAGNOSIS — R19.8 CHANGE IN BOWEL MOVEMENT: Primary | ICD-10-CM

## 2024-11-11 DIAGNOSIS — Z12.11 ENCOUNTER FOR SCREENING COLONOSCOPY: ICD-10-CM

## 2024-11-11 NOTE — TELEPHONE ENCOUNTER
Yara from Dr. William's office  called in and stated that the patient would need a referral from [facility]. The patient was seen by Dr. William on 10/21/2024 and is scheduled for a Colonoscopy on 11/27/2024.     Uri stated that  this patient was seen before but because of his insurance [facility] that he would need a referral done.

## 2024-11-20 NOTE — TELEPHONE ENCOUNTER
Left message for Dr. Avelar [phone] to discuss the  authorization the needs completed.  Usually the performing office will complete the authorization requirements for the insurance.

## 2024-11-20 NOTE — TELEPHONE ENCOUNTER
"Yara called back regarding this. She said due to the patient's insurance the referral would need to be submitted directly to [facility] to notify them that the patient is being treated and evaluated for this so it can be covered. She is also requesting a new referral be placed as a \"retro referral\" to evaluate and treat and to cover his office visit on 10/21 & the colonoscopy.    " no stridor

## 2024-11-21 NOTE — TELEPHONE ENCOUNTER
Spoke to Yara ([phone]) at Dr. Mat William's office.  She was not able to complete the authorization on her end because  [employer] requires the PCP to complete the authorizations.  The colonoscopy is scheduled for 11/27/2024.  He was also seen on 10/21/2024 and that office visit needs a back dated authorization.    I called [employer] ([phone]). I spoke to Radha.  She stated that I could not complete an authorization over the phone.  It had to be completed online at ([email or web address])     Ralph Shelton  ID: [id number]    Dr. Mat Avelar  NPI: [id number]    Address:  [address] [place] [address], [address]  [place] [address]     Sur[facility] Center:  NPI: [id number]  Tax ID: [id number]    [place] [address]  Diagnosis: R19.8 changes in bowel movement    I went online and completed both request.  I also notified Yara at Dr. William's Office.

## 2024-11-25 NOTE — TELEPHONE ENCOUNTER
Called [facility] to check on the status of referral I placed last week.  They could not find it.  I spoke to Joni SCHWAB ([phone]) and he helped re-enter the referral for the colonoscopy.  It is not visible online and states that it is in process.

## 2024-11-27 ENCOUNTER — APPOINTMENT (OUTPATIENT)
Dept: GASTROENTEROLOGY | Facility: EXTERNAL LOCATION | Age: 44
End: 2024-11-27
Payer: OTHER GOVERNMENT

## 2024-11-27 DIAGNOSIS — R19.8 CHANGE IN BOWEL MOVEMENT: ICD-10-CM

## 2024-11-27 DIAGNOSIS — D12.2 BENIGN NEOPLASM OF ASCENDING COLON: Primary | ICD-10-CM

## 2024-11-27 DIAGNOSIS — D12.7 BENIGN NEOPLASM OF RECTOSIGMOID JUNCTION: ICD-10-CM

## 2024-11-27 DIAGNOSIS — R19.4 CHANGE IN BOWEL HABIT: ICD-10-CM

## 2024-11-27 DIAGNOSIS — D12.3 BENIGN NEOPLASM OF TRANSVERSE COLON: ICD-10-CM

## 2024-11-27 DIAGNOSIS — D12.5 BENIGN NEOPLASM OF SIGMOID COLON: ICD-10-CM

## 2024-11-27 DIAGNOSIS — R19.7 DIARRHEA, UNSPECIFIED TYPE: ICD-10-CM

## 2024-11-27 PROCEDURE — 88305 TISSUE EXAM BY PATHOLOGIST: CPT | Performed by: PATHOLOGY

## 2024-11-27 PROCEDURE — 45385 COLONOSCOPY W/LESION REMOVAL: CPT | Performed by: INTERNAL MEDICINE

## 2024-11-27 PROCEDURE — 45380 COLONOSCOPY AND BIOPSY: CPT | Performed by: INTERNAL MEDICINE

## 2024-11-27 PROCEDURE — 88305 TISSUE EXAM BY PATHOLOGIST: CPT

## 2024-11-29 ENCOUNTER — LAB REQUISITION (OUTPATIENT)
Dept: LAB | Facility: HOSPITAL | Age: 44
End: 2024-11-29
Payer: OTHER GOVERNMENT

## 2024-12-09 ENCOUNTER — APPOINTMENT (OUTPATIENT)
Dept: PRIMARY CARE | Facility: CLINIC | Age: 44
End: 2024-12-09
Payer: OTHER GOVERNMENT

## 2024-12-10 LAB
LABORATORY COMMENT REPORT: NORMAL
PATH REPORT.FINAL DX SPEC: NORMAL
PATH REPORT.GROSS SPEC: NORMAL
PATH REPORT.RELEVANT HX SPEC: NORMAL
PATH REPORT.TOTAL CANCER: NORMAL

## 2025-07-08 ENCOUNTER — TELEPHONE (OUTPATIENT)
Dept: PRIMARY CARE | Facility: CLINIC | Age: 45
End: 2025-07-08
Payer: OTHER GOVERNMENT

## 2025-07-08 NOTE — TELEPHONE ENCOUNTER
The patient scheduled for his yearly exam and requested lab prior to his visit.     He is scheduled for 8/25/2025.     Please advise.

## 2025-07-10 DIAGNOSIS — E78.2 MIXED HYPERLIPIDEMIA: ICD-10-CM

## 2025-07-10 DIAGNOSIS — E55.9 VITAMIN D DEFICIENCY: Primary | ICD-10-CM

## 2025-07-10 DIAGNOSIS — R53.83 OTHER FATIGUE: ICD-10-CM

## 2025-07-10 DIAGNOSIS — K76.0 NAFLD (NONALCOHOLIC FATTY LIVER DISEASE): ICD-10-CM

## 2025-07-10 PROBLEM — Z13.29 THYROID DISORDER SCREEN: Status: RESOLVED | Noted: 2024-03-10 | Resolved: 2025-07-10

## 2025-07-11 DIAGNOSIS — K76.0 NAFLD (NONALCOHOLIC FATTY LIVER DISEASE): ICD-10-CM

## 2025-07-11 DIAGNOSIS — E55.9 VITAMIN D DEFICIENCY: ICD-10-CM

## 2025-07-11 DIAGNOSIS — E78.2 MIXED HYPERLIPIDEMIA: ICD-10-CM

## 2025-07-11 DIAGNOSIS — R53.83 OTHER FATIGUE: ICD-10-CM

## 2025-07-25 ENCOUNTER — TELEPHONE (OUTPATIENT)
Dept: PRIMARY CARE | Facility: CLINIC | Age: 45
End: 2025-07-25
Payer: OTHER GOVERNMENT

## 2025-07-25 NOTE — TELEPHONE ENCOUNTER
Ralph called because his maternal uncle was diagnosis with Heterochromatosis (iron overload).  He is due to see you on 8/25/2025.  He wanted to know if you would order the labs to be tested for that.

## 2025-07-28 DIAGNOSIS — E83.10 DISORDER OF IRON METABOLISM: Primary | ICD-10-CM

## 2025-07-28 DIAGNOSIS — Z83.49 FAMILY HISTORY OF HEMOCHROMATOSIS: ICD-10-CM

## 2025-08-05 LAB
25(OH)D3+25(OH)D2 SERPL-MCNC: 29 NG/ML (ref 30–100)
ALBUMIN SERPL-MCNC: 4.6 G/DL (ref 3.6–5.1)
ALP SERPL-CCNC: 76 U/L (ref 36–130)
ALT SERPL-CCNC: 27 U/L (ref 9–46)
ANION GAP SERPL CALCULATED.4IONS-SCNC: 7 MMOL/L (CALC) (ref 7–17)
AST SERPL-CCNC: 24 U/L (ref 10–40)
BILIRUB SERPL-MCNC: 0.5 MG/DL (ref 0.2–1.2)
BUN SERPL-MCNC: 20 MG/DL (ref 7–25)
CALCIUM SERPL-MCNC: 9.4 MG/DL (ref 8.6–10.3)
CHLORIDE SERPL-SCNC: 103 MMOL/L (ref 98–110)
CHOLEST SERPL-MCNC: 212 MG/DL
CHOLEST/HDLC SERPL: 5.4 (CALC)
CO2 SERPL-SCNC: 26 MMOL/L (ref 20–32)
CREAT SERPL-MCNC: 0.7 MG/DL (ref 0.6–1.29)
EGFRCR SERPLBLD CKD-EPI 2021: 117 ML/MIN/1.73M2
ERYTHROCYTE [DISTWIDTH] IN BLOOD BY AUTOMATED COUNT: 13.4 % (ref 11–15)
FERRITIN SERPL-MCNC: 55 NG/ML (ref 38–380)
GLUCOSE SERPL-MCNC: 87 MG/DL (ref 65–99)
HCT VFR BLD AUTO: 47.4 % (ref 38.5–50)
HDLC SERPL-MCNC: 39 MG/DL
HFE GENE MUT ANL BLD/T: NORMAL
HGB BLD-MCNC: 14.9 G/DL (ref 13.2–17.1)
IRON SERPL-MCNC: 60 MCG/DL (ref 50–180)
LDLC SERPL CALC-MCNC: 148 MG/DL (CALC)
MCH RBC QN AUTO: 27 PG (ref 27–33)
MCHC RBC AUTO-ENTMCNC: 31.4 G/DL (ref 32–36)
MCV RBC AUTO: 86 FL (ref 80–100)
NONHDLC SERPL-MCNC: 173 MG/DL (CALC)
PLATELET # BLD AUTO: 198 THOUSAND/UL (ref 140–400)
PMV BLD REES-ECKER: 10.8 FL (ref 7.5–12.5)
POTASSIUM SERPL-SCNC: 4.2 MMOL/L (ref 3.5–5.3)
PROT SERPL-MCNC: 7.1 G/DL (ref 6.1–8.1)
RBC # BLD AUTO: 5.51 MILLION/UL (ref 4.2–5.8)
SODIUM SERPL-SCNC: 136 MMOL/L (ref 135–146)
TRIGL SERPL-MCNC: 128 MG/DL
TSH SERPL-ACNC: 1.86 MIU/L (ref 0.4–4.5)
WBC # BLD AUTO: 7.4 THOUSAND/UL (ref 3.8–10.8)

## 2025-08-15 LAB
FERRITIN SERPL-MCNC: 55 NG/ML (ref 38–380)
HFE GENE MUT ANL BLD/T: NORMAL
IRON SERPL-MCNC: 60 MCG/DL (ref 50–180)

## 2025-08-25 ENCOUNTER — APPOINTMENT (OUTPATIENT)
Dept: PRIMARY CARE | Facility: CLINIC | Age: 45
End: 2025-08-25
Payer: OTHER GOVERNMENT

## 2025-08-25 VITALS
WEIGHT: 222.4 LBS | HEART RATE: 72 BPM | BODY MASS INDEX: 35.74 KG/M2 | HEIGHT: 66 IN | DIASTOLIC BLOOD PRESSURE: 81 MMHG | TEMPERATURE: 98.2 F | OXYGEN SATURATION: 95 % | SYSTOLIC BLOOD PRESSURE: 158 MMHG

## 2025-08-25 DIAGNOSIS — L84 FOOT CALLUS: ICD-10-CM

## 2025-08-25 DIAGNOSIS — K21.9 GASTROESOPHAGEAL REFLUX DISEASE, UNSPECIFIED WHETHER ESOPHAGITIS PRESENT: ICD-10-CM

## 2025-08-25 DIAGNOSIS — R10.9 ABDOMINAL WALL PAIN: ICD-10-CM

## 2025-08-25 DIAGNOSIS — E78.2 MIXED HYPERLIPIDEMIA: ICD-10-CM

## 2025-08-25 DIAGNOSIS — I10 BENIGN ESSENTIAL HYPERTENSION: ICD-10-CM

## 2025-08-25 DIAGNOSIS — Z00.00 ANNUAL PHYSICAL EXAM: Primary | ICD-10-CM

## 2025-08-25 PROBLEM — E66.812 CLASS 2 SEVERE OBESITY DUE TO EXCESS CALORIES WITH SERIOUS COMORBIDITY AND BODY MASS INDEX (BMI) OF 39.0 TO 39.9 IN ADULT: Status: RESOLVED | Noted: 2023-02-18 | Resolved: 2025-08-25

## 2025-08-25 PROBLEM — G56.90: Status: RESOLVED | Noted: 2024-06-17 | Resolved: 2025-08-25

## 2025-08-25 PROBLEM — N45.1 EPIDIDYMITIS: Status: RESOLVED | Noted: 2024-06-17 | Resolved: 2025-08-25

## 2025-08-25 PROBLEM — R21 RASH AND NONSPECIFIC SKIN ERUPTION: Status: RESOLVED | Noted: 2024-06-17 | Resolved: 2025-08-25

## 2025-08-25 PROBLEM — K62.5 RECTAL BLEEDING: Status: RESOLVED | Noted: 2023-02-18 | Resolved: 2025-08-25

## 2025-08-25 PROBLEM — M79.672 LEFT FOOT PAIN: Status: RESOLVED | Noted: 2023-02-18 | Resolved: 2025-08-25

## 2025-08-25 PROBLEM — E66.01 CLASS 2 SEVERE OBESITY DUE TO EXCESS CALORIES WITH SERIOUS COMORBIDITY AND BODY MASS INDEX (BMI) OF 39.0 TO 39.9 IN ADULT: Status: RESOLVED | Noted: 2023-02-18 | Resolved: 2025-08-25

## 2025-08-25 PROBLEM — R19.8 CHANGE IN BOWEL MOVEMENT: Status: RESOLVED | Noted: 2024-06-17 | Resolved: 2025-08-25

## 2025-08-25 PROBLEM — E83.10 DISORDER OF IRON METABOLISM: Status: RESOLVED | Noted: 2023-02-18 | Resolved: 2025-08-25

## 2025-08-25 PROCEDURE — 99396 PREV VISIT EST AGE 40-64: CPT | Performed by: FAMILY MEDICINE

## 2025-08-25 PROCEDURE — 3077F SYST BP >= 140 MM HG: CPT | Performed by: FAMILY MEDICINE

## 2025-08-25 PROCEDURE — 3008F BODY MASS INDEX DOCD: CPT | Performed by: FAMILY MEDICINE

## 2025-08-25 PROCEDURE — 3079F DIAST BP 80-89 MM HG: CPT | Performed by: FAMILY MEDICINE

## 2025-08-25 PROCEDURE — 99213 OFFICE O/P EST LOW 20 MIN: CPT | Performed by: FAMILY MEDICINE

## 2025-08-25 RX ORDER — LISINOPRIL 20 MG/1
20 TABLET ORAL DAILY
Qty: 90 TABLET | Refills: 1 | Status: CANCELLED | OUTPATIENT
Start: 2025-08-25 | End: 2026-02-21

## 2025-08-25 RX ORDER — OMEPRAZOLE 20 MG/1
20 CAPSULE, DELAYED RELEASE ORAL
Qty: 100 CAPSULE | Refills: 1 | Status: SHIPPED | OUTPATIENT
Start: 2025-08-25 | End: 2026-03-13

## 2025-08-25 RX ORDER — METOPROLOL SUCCINATE 25 MG/1
25 TABLET, EXTENDED RELEASE ORAL DAILY
Qty: 100 TABLET | Refills: 1 | Status: SHIPPED | OUTPATIENT
Start: 2025-08-25 | End: 2026-03-13

## 2025-08-25 RX ORDER — CYCLOBENZAPRINE HCL 10 MG
10 TABLET ORAL 3 TIMES DAILY
COMMUNITY
Start: 2025-08-22

## 2025-08-25 RX ORDER — GABAPENTIN 300 MG/1
300 CAPSULE ORAL NIGHTLY
Qty: 30 CAPSULE | Refills: 2 | Status: SHIPPED | OUTPATIENT
Start: 2025-08-25 | End: 2025-11-23

## 2025-08-25 RX ORDER — LISINOPRIL 30 MG/1
30 TABLET ORAL DAILY
Qty: 100 TABLET | Refills: 3 | Status: SHIPPED | OUTPATIENT
Start: 2025-08-25 | End: 2026-09-29

## 2025-08-25 ASSESSMENT — PATIENT HEALTH QUESTIONNAIRE - PHQ9
2. FEELING DOWN, DEPRESSED OR HOPELESS: NOT AT ALL
SUM OF ALL RESPONSES TO PHQ9 QUESTIONS 1 AND 2: 0
1. LITTLE INTEREST OR PLEASURE IN DOING THINGS: NOT AT ALL

## 2025-08-25 ASSESSMENT — ENCOUNTER SYMPTOMS
ARTHRALGIAS: 0
CHILLS: 0
CONFUSION: 0
FEVER: 0
PALPITATIONS: 0
SHORTNESS OF BREATH: 0
CHEST TIGHTNESS: 0
ABDOMINAL PAIN: 1

## 2025-08-25 ASSESSMENT — PAIN SCALES - GENERAL: PAINLEVEL_OUTOF10: 8

## 2025-08-26 ENCOUNTER — TELEPHONE (OUTPATIENT)
Dept: PRIMARY CARE | Facility: CLINIC | Age: 45
End: 2025-08-26
Payer: OTHER GOVERNMENT

## 2025-08-26 DIAGNOSIS — R10.9 ABDOMINAL WALL PAIN: Primary | ICD-10-CM

## 2025-08-29 ENCOUNTER — OFFICE VISIT (OUTPATIENT)
Dept: SURGERY | Facility: CLINIC | Age: 45
End: 2025-08-29
Payer: OTHER GOVERNMENT

## 2025-08-29 VITALS
WEIGHT: 219 LBS | DIASTOLIC BLOOD PRESSURE: 80 MMHG | HEART RATE: 64 BPM | SYSTOLIC BLOOD PRESSURE: 144 MMHG | BODY MASS INDEX: 35.2 KG/M2 | HEIGHT: 66 IN

## 2025-09-02 ENCOUNTER — APPOINTMENT (OUTPATIENT)
Dept: SLEEP MEDICINE | Facility: CLINIC | Age: 45
End: 2025-09-02
Payer: OTHER GOVERNMENT

## 2025-10-07 ENCOUNTER — APPOINTMENT (OUTPATIENT)
Dept: PRIMARY CARE | Facility: CLINIC | Age: 45
End: 2025-10-07
Payer: OTHER GOVERNMENT

## 2025-10-23 ENCOUNTER — APPOINTMENT (OUTPATIENT)
Dept: PODIATRY | Facility: CLINIC | Age: 45
End: 2025-10-23
Payer: OTHER GOVERNMENT